# Patient Record
Sex: FEMALE | NOT HISPANIC OR LATINO | Employment: OTHER | URBAN - NONMETROPOLITAN AREA
[De-identification: names, ages, dates, MRNs, and addresses within clinical notes are randomized per-mention and may not be internally consistent; named-entity substitution may affect disease eponyms.]

---

## 2017-09-06 ENCOUNTER — LAB REQUISITION (OUTPATIENT)
Dept: LAB | Facility: HOSPITAL | Age: 77
End: 2017-09-06

## 2017-09-06 DIAGNOSIS — Z00.00 ENCOUNTER FOR GENERAL ADULT MEDICAL EXAMINATION WITHOUT ABNORMAL FINDINGS: ICD-10-CM

## 2017-09-06 PROCEDURE — 88307 TISSUE EXAM BY PATHOLOGIST: CPT | Performed by: SURGERY

## 2017-09-06 PROCEDURE — 88312 SPECIAL STAINS GROUP 1: CPT | Performed by: SURGERY

## 2017-09-13 LAB
CYTO UR: NORMAL
LAB AP CASE REPORT: NORMAL
LAB AP CLINICAL INFORMATION: NORMAL
Lab: NORMAL
PATH REPORT.FINAL DX SPEC: NORMAL
PATH REPORT.GROSS SPEC: NORMAL

## 2019-07-05 ENCOUNTER — HOSPITAL ENCOUNTER (OUTPATIENT)
Dept: PREADMISSION TESTING | Age: 79
Discharge: HOME | End: 2019-07-09
Payer: MEDICARE

## 2019-07-05 VITALS — WEIGHT: 250 LBS | HEIGHT: 69 IN | BODY MASS INDEX: 37.03 KG/M2

## 2019-07-05 LAB
ANION GAP SERPL CALCULATED.3IONS-SCNC: 14 MMOL/L (ref 7–19)
APTT: 33.8 SEC (ref 26–36.2)
BACTERIA: ABNORMAL /HPF
BASOPHILS ABSOLUTE: 0.1 K/UL (ref 0–0.2)
BASOPHILS RELATIVE PERCENT: 0.8 % (ref 0–1)
BILIRUBIN URINE: NEGATIVE
BLOOD, URINE: NEGATIVE
BUN BLDV-MCNC: 24 MG/DL (ref 8–23)
CALCIUM SERPL-MCNC: 9.3 MG/DL (ref 8.8–10.2)
CHLORIDE BLD-SCNC: 103 MMOL/L (ref 98–111)
CLARITY: ABNORMAL
CO2: 23 MMOL/L (ref 22–29)
COLOR: YELLOW
CREAT SERPL-MCNC: 0.7 MG/DL (ref 0.5–0.9)
EOSINOPHILS ABSOLUTE: 0.4 K/UL (ref 0–0.6)
EOSINOPHILS RELATIVE PERCENT: 4.9 % (ref 0–5)
EPITHELIAL CELLS, UA: 0 /HPF (ref 0–5)
GFR NON-AFRICAN AMERICAN: >60
GLUCOSE BLD-MCNC: 209 MG/DL (ref 74–109)
GLUCOSE URINE: NEGATIVE MG/DL
HCT VFR BLD CALC: 35.7 % (ref 37–47)
HEMOGLOBIN: 10.4 G/DL (ref 12–16)
HYALINE CASTS: 1 /HPF (ref 0–8)
INR BLD: 2.41 (ref 0.88–1.18)
KETONES, URINE: NEGATIVE MG/DL
LEUKOCYTE ESTERASE, URINE: ABNORMAL
LYMPHOCYTES ABSOLUTE: 1.6 K/UL (ref 1.1–4.5)
LYMPHOCYTES RELATIVE PERCENT: 22.1 % (ref 20–40)
MCH RBC QN AUTO: 22.8 PG (ref 27–31)
MCHC RBC AUTO-ENTMCNC: 29.1 G/DL (ref 33–37)
MCV RBC AUTO: 78.3 FL (ref 81–99)
MONOCYTES ABSOLUTE: 0.6 K/UL (ref 0–0.9)
MONOCYTES RELATIVE PERCENT: 7.7 % (ref 0–10)
NEUTROPHILS ABSOLUTE: 4.6 K/UL (ref 1.5–7.5)
NEUTROPHILS RELATIVE PERCENT: 64.1 % (ref 50–65)
NITRITE, URINE: NEGATIVE
PDW BLD-RTO: 17.3 % (ref 11.5–14.5)
PH UA: 6 (ref 5–8)
PLATELET # BLD: 159 K/UL (ref 130–400)
PMV BLD AUTO: 11.2 FL (ref 9.4–12.3)
POTASSIUM SERPL-SCNC: 4.1 MMOL/L (ref 3.5–5)
PROTEIN UA: NEGATIVE MG/DL
PROTHROMBIN TIME: 25.5 SEC (ref 12–14.6)
RBC # BLD: 4.56 M/UL (ref 4.2–5.4)
RBC UA: 1 /HPF (ref 0–4)
SODIUM BLD-SCNC: 140 MMOL/L (ref 136–145)
SPECIFIC GRAVITY UA: 1.02 (ref 1–1.03)
URINE REFLEX TO CULTURE: YES
UROBILINOGEN, URINE: 0.2 E.U./DL
WBC # BLD: 7.2 K/UL (ref 4.8–10.8)
WBC UA: 25 /HPF (ref 0–5)

## 2019-07-05 PROCEDURE — 87186 SC STD MICRODIL/AGAR DIL: CPT

## 2019-07-05 PROCEDURE — 85025 COMPLETE CBC W/AUTO DIFF WBC: CPT

## 2019-07-05 PROCEDURE — 87077 CULTURE AEROBIC IDENTIFY: CPT

## 2019-07-05 PROCEDURE — 85610 PROTHROMBIN TIME: CPT

## 2019-07-05 PROCEDURE — 93005 ELECTROCARDIOGRAM TRACING: CPT

## 2019-07-05 PROCEDURE — 85730 THROMBOPLASTIN TIME PARTIAL: CPT

## 2019-07-05 PROCEDURE — 81001 URINALYSIS AUTO W/SCOPE: CPT

## 2019-07-05 PROCEDURE — 87081 CULTURE SCREEN ONLY: CPT

## 2019-07-05 PROCEDURE — 36415 COLL VENOUS BLD VENIPUNCTURE: CPT

## 2019-07-05 PROCEDURE — 80048 BASIC METABOLIC PNL TOTAL CA: CPT

## 2019-07-05 PROCEDURE — 87086 URINE CULTURE/COLONY COUNT: CPT

## 2019-07-05 RX ORDER — NABUMETONE 500 MG/1
500 TABLET, FILM COATED ORAL 3 TIMES DAILY
COMMUNITY
End: 2020-06-23

## 2019-07-05 RX ORDER — RANITIDINE 150 MG/1
150 TABLET ORAL 2 TIMES DAILY
COMMUNITY
End: 2020-06-23

## 2019-07-05 RX ORDER — HYDROCHLOROTHIAZIDE 12.5 MG/1
12.5 CAPSULE, GELATIN COATED ORAL EVERY MORNING
COMMUNITY

## 2019-07-05 RX ORDER — GLIPIZIDE 10 MG/1
10 TABLET ORAL
COMMUNITY

## 2019-07-05 RX ORDER — SIMVASTATIN 40 MG
40 TABLET ORAL NIGHTLY
COMMUNITY

## 2019-07-05 RX ORDER — WARFARIN SODIUM 10 MG/1
5 TABLET ORAL DAILY
COMMUNITY
End: 2020-06-23

## 2019-07-05 RX ORDER — WARFARIN SODIUM 1 MG/1
2 TABLET ORAL DAILY
COMMUNITY
End: 2019-11-05

## 2019-07-05 RX ORDER — LEVOTHYROXINE SODIUM 0.2 MG/1
200 TABLET ORAL DAILY
COMMUNITY

## 2019-07-05 RX ORDER — LOSARTAN POTASSIUM 100 MG/1
100 TABLET ORAL DAILY
COMMUNITY

## 2019-07-05 RX ORDER — PAROXETINE HYDROCHLORIDE 20 MG/1
20 TABLET, FILM COATED ORAL EVERY MORNING
COMMUNITY

## 2019-07-05 SDOH — HEALTH STABILITY: MENTAL HEALTH: HOW OFTEN DO YOU HAVE A DRINK CONTAINING ALCOHOL?: NEVER

## 2019-07-06 LAB — MRSA CULTURE ONLY: NORMAL

## 2019-07-07 LAB
EKG P AXIS: 19 DEGREES
EKG P-R INTERVAL: 176 MS
EKG Q-T INTERVAL: 392 MS
EKG QRS DURATION: 106 MS
EKG QTC CALCULATION (BAZETT): 421 MS
EKG T AXIS: 20 DEGREES

## 2019-07-08 LAB
ORGANISM: ABNORMAL
ORGANISM: ABNORMAL
URINE CULTURE, ROUTINE: ABNORMAL

## 2019-07-22 RX ORDER — OXYCODONE HYDROCHLORIDE 5 MG/1
10 TABLET ORAL EVERY 4 HOURS PRN
Status: CANCELLED | OUTPATIENT
Start: 2019-07-22

## 2019-07-22 RX ORDER — OXYCODONE HYDROCHLORIDE 5 MG/1
20 TABLET ORAL EVERY 4 HOURS PRN
Status: CANCELLED | OUTPATIENT
Start: 2019-07-22

## 2019-07-25 ENCOUNTER — ANESTHESIA (OUTPATIENT)
Dept: OPERATING ROOM | Age: 79
DRG: 470 | End: 2019-07-25
Payer: MEDICARE

## 2019-07-25 ENCOUNTER — HOSPITAL ENCOUNTER (INPATIENT)
Age: 79
LOS: 3 days | Discharge: NURSING, REHAB OR LONG-TERM CARE | DRG: 470 | End: 2019-07-28
Attending: ORTHOPAEDIC SURGERY | Admitting: ORTHOPAEDIC SURGERY
Payer: MEDICARE

## 2019-07-25 ENCOUNTER — APPOINTMENT (OUTPATIENT)
Dept: GENERAL RADIOLOGY | Age: 79
DRG: 470 | End: 2019-07-25
Attending: ORTHOPAEDIC SURGERY
Payer: MEDICARE

## 2019-07-25 ENCOUNTER — ANESTHESIA EVENT (OUTPATIENT)
Dept: OPERATING ROOM | Age: 79
DRG: 470 | End: 2019-07-25
Payer: MEDICARE

## 2019-07-25 VITALS
SYSTOLIC BLOOD PRESSURE: 119 MMHG | TEMPERATURE: 98.1 F | RESPIRATION RATE: 1 BRPM | OXYGEN SATURATION: 99 % | DIASTOLIC BLOOD PRESSURE: 57 MMHG

## 2019-07-25 DIAGNOSIS — M16.11 PRIMARY OSTEOARTHRITIS OF RIGHT HIP: Primary | ICD-10-CM

## 2019-07-25 LAB
ABO/RH: NORMAL
ANTIBODY SCREEN: NORMAL
GLUCOSE BLD-MCNC: 207 MG/DL (ref 70–99)
GLUCOSE BLD-MCNC: 294 MG/DL (ref 70–99)
GLUCOSE BLD-MCNC: 304 MG/DL (ref 70–99)
GLUCOSE BLD-MCNC: 394 MG/DL (ref 70–99)
INR BLD: 1.16 (ref 0.88–1.18)
PERFORMED ON: ABNORMAL
PROTHROMBIN TIME: 14.2 SEC (ref 12–14.6)

## 2019-07-25 PROCEDURE — 3600000015 HC SURGERY LEVEL 5 ADDTL 15MIN: Performed by: ORTHOPAEDIC SURGERY

## 2019-07-25 PROCEDURE — 2580000003 HC RX 258: Performed by: ORTHOPAEDIC SURGERY

## 2019-07-25 PROCEDURE — 36415 COLL VENOUS BLD VENIPUNCTURE: CPT

## 2019-07-25 PROCEDURE — 6370000000 HC RX 637 (ALT 250 FOR IP): Performed by: ORTHOPAEDIC SURGERY

## 2019-07-25 PROCEDURE — 3600000005 HC SURGERY LEVEL 5 BASE: Performed by: ORTHOPAEDIC SURGERY

## 2019-07-25 PROCEDURE — 1210000000 HC MED SURG R&B

## 2019-07-25 PROCEDURE — 2709999900 HC NON-CHARGEABLE SUPPLY: Performed by: ORTHOPAEDIC SURGERY

## 2019-07-25 PROCEDURE — 6360000002 HC RX W HCPCS: Performed by: ORTHOPAEDIC SURGERY

## 2019-07-25 PROCEDURE — 82948 REAGENT STRIP/BLOOD GLUCOSE: CPT

## 2019-07-25 PROCEDURE — 6360000002 HC RX W HCPCS

## 2019-07-25 PROCEDURE — 2500000003 HC RX 250 WO HCPCS

## 2019-07-25 PROCEDURE — 2700000000 HC OXYGEN THERAPY PER DAY

## 2019-07-25 PROCEDURE — C1713 ANCHOR/SCREW BN/BN,TIS/BN: HCPCS | Performed by: ORTHOPAEDIC SURGERY

## 2019-07-25 PROCEDURE — 3700000001 HC ADD 15 MINUTES (ANESTHESIA): Performed by: ORTHOPAEDIC SURGERY

## 2019-07-25 PROCEDURE — 86901 BLOOD TYPING SEROLOGIC RH(D): CPT

## 2019-07-25 PROCEDURE — C1776 JOINT DEVICE (IMPLANTABLE): HCPCS | Performed by: ORTHOPAEDIC SURGERY

## 2019-07-25 PROCEDURE — 85610 PROTHROMBIN TIME: CPT

## 2019-07-25 PROCEDURE — 86850 RBC ANTIBODY SCREEN: CPT

## 2019-07-25 PROCEDURE — 86900 BLOOD TYPING SEROLOGIC ABO: CPT

## 2019-07-25 PROCEDURE — 3700000000 HC ANESTHESIA ATTENDED CARE: Performed by: ORTHOPAEDIC SURGERY

## 2019-07-25 PROCEDURE — 3209999900 FLUORO FOR SURGICAL PROCEDURES

## 2019-07-25 PROCEDURE — 0SR903A REPLACEMENT OF RIGHT HIP JOINT WITH CERAMIC SYNTHETIC SUBSTITUTE, UNCEMENTED, OPEN APPROACH: ICD-10-PCS | Performed by: ORTHOPAEDIC SURGERY

## 2019-07-25 PROCEDURE — 2500000003 HC RX 250 WO HCPCS: Performed by: ORTHOPAEDIC SURGERY

## 2019-07-25 PROCEDURE — 73502 X-RAY EXAM HIP UNI 2-3 VIEWS: CPT

## 2019-07-25 PROCEDURE — 7100000001 HC PACU RECOVERY - ADDTL 15 MIN: Performed by: ORTHOPAEDIC SURGERY

## 2019-07-25 PROCEDURE — 7100000000 HC PACU RECOVERY - FIRST 15 MIN: Performed by: ORTHOPAEDIC SURGERY

## 2019-07-25 DEVICE — ORIGIN STEM, STANDARD OFFSET SIZE 12 COLLARED
Type: IMPLANTABLE DEVICE | Site: HIP | Status: FUNCTIONAL
Brand: PAXEON ORIGIN

## 2019-07-25 DEVICE — IMPLANTABLE DEVICE
Type: IMPLANTABLE DEVICE | Site: HIP | Status: FUNCTIONAL
Brand: LOGICAL

## 2019-07-25 DEVICE — IMPLANTABLE DEVICE
Type: IMPLANTABLE DEVICE | Site: HIP | Status: FUNCTIONAL
Brand: SIGNATURE FEMORAL HEAD

## 2019-07-25 DEVICE — IMPLANTABLE DEVICE
Type: IMPLANTABLE DEVICE | Site: HIP | Status: FUNCTIONAL
Brand: LOGICAL G-SERIES

## 2019-07-25 RX ORDER — TRANEXAMIC ACID 650 1/1
1950 TABLET ORAL
Status: COMPLETED | OUTPATIENT
Start: 2019-07-25 | End: 2019-07-25

## 2019-07-25 RX ORDER — FAMOTIDINE 20 MG/1
20 TABLET, FILM COATED ORAL 2 TIMES DAILY PRN
Status: DISCONTINUED | OUTPATIENT
Start: 2019-07-25 | End: 2019-07-28 | Stop reason: HOSPADM

## 2019-07-25 RX ORDER — DEXAMETHASONE SODIUM PHOSPHATE 10 MG/ML
INJECTION INTRAMUSCULAR; INTRAVENOUS PRN
Status: DISCONTINUED | OUTPATIENT
Start: 2019-07-25 | End: 2019-07-25 | Stop reason: SDUPTHER

## 2019-07-25 RX ORDER — DEXAMETHASONE SODIUM PHOSPHATE 10 MG/ML
8 INJECTION INTRAMUSCULAR; INTRAVENOUS ONCE
Status: DISCONTINUED | OUTPATIENT
Start: 2019-07-25 | End: 2019-07-25 | Stop reason: HOSPADM

## 2019-07-25 RX ORDER — WARFARIN SODIUM 5 MG/1
10 TABLET ORAL DAILY
Status: DISCONTINUED | OUTPATIENT
Start: 2019-07-25 | End: 2019-07-25 | Stop reason: DRUGHIGH

## 2019-07-25 RX ORDER — FAMOTIDINE 20 MG/1
20 TABLET, FILM COATED ORAL NIGHTLY
Status: DISCONTINUED | OUTPATIENT
Start: 2019-07-25 | End: 2019-07-28 | Stop reason: HOSPADM

## 2019-07-25 RX ORDER — SODIUM CHLORIDE 9 MG/ML
INJECTION, SOLUTION INTRAVENOUS CONTINUOUS
Status: DISCONTINUED | OUTPATIENT
Start: 2019-07-25 | End: 2019-07-25

## 2019-07-25 RX ORDER — PROMETHAZINE HYDROCHLORIDE 25 MG/ML
6.25 INJECTION, SOLUTION INTRAMUSCULAR; INTRAVENOUS
Status: DISCONTINUED | OUTPATIENT
Start: 2019-07-25 | End: 2019-07-25 | Stop reason: HOSPADM

## 2019-07-25 RX ORDER — ROPIVACAINE HYDROCHLORIDE 2 MG/ML
INJECTION, SOLUTION EPIDURAL; INFILTRATION; PERINEURAL PRN
Status: DISCONTINUED | OUTPATIENT
Start: 2019-07-25 | End: 2019-07-25 | Stop reason: HOSPADM

## 2019-07-25 RX ORDER — SODIUM CHLORIDE, SODIUM LACTATE, POTASSIUM CHLORIDE, CALCIUM CHLORIDE 600; 310; 30; 20 MG/100ML; MG/100ML; MG/100ML; MG/100ML
INJECTION, SOLUTION INTRAVENOUS CONTINUOUS
Status: DISCONTINUED | OUTPATIENT
Start: 2019-07-25 | End: 2019-07-25

## 2019-07-25 RX ORDER — LABETALOL 20 MG/4 ML (5 MG/ML) INTRAVENOUS SYRINGE
5 EVERY 10 MIN PRN
Status: DISCONTINUED | OUTPATIENT
Start: 2019-07-25 | End: 2019-07-25 | Stop reason: HOSPADM

## 2019-07-25 RX ORDER — SODIUM CHLORIDE 0.9 % (FLUSH) 0.9 %
10 SYRINGE (ML) INJECTION EVERY 12 HOURS SCHEDULED
Status: DISCONTINUED | OUTPATIENT
Start: 2019-07-25 | End: 2019-07-25 | Stop reason: HOSPADM

## 2019-07-25 RX ORDER — DOCUSATE SODIUM 100 MG/1
100 CAPSULE, LIQUID FILLED ORAL 2 TIMES DAILY
Status: DISCONTINUED | OUTPATIENT
Start: 2019-07-25 | End: 2019-07-28 | Stop reason: HOSPADM

## 2019-07-25 RX ORDER — FENTANYL CITRATE 50 UG/ML
50 INJECTION, SOLUTION INTRAMUSCULAR; INTRAVENOUS
Status: DISCONTINUED | OUTPATIENT
Start: 2019-07-25 | End: 2019-07-25 | Stop reason: HOSPADM

## 2019-07-25 RX ORDER — MORPHINE SULFATE 2 MG/ML
2 INJECTION, SOLUTION INTRAMUSCULAR; INTRAVENOUS EVERY 5 MIN PRN
Status: DISCONTINUED | OUTPATIENT
Start: 2019-07-25 | End: 2019-07-25 | Stop reason: HOSPADM

## 2019-07-25 RX ORDER — SODIUM CHLORIDE 0.9 % (FLUSH) 0.9 %
10 SYRINGE (ML) INJECTION PRN
Status: DISCONTINUED | OUTPATIENT
Start: 2019-07-25 | End: 2019-07-25 | Stop reason: HOSPADM

## 2019-07-25 RX ORDER — GABAPENTIN 300 MG/1
300 CAPSULE ORAL ONCE
Status: COMPLETED | OUTPATIENT
Start: 2019-07-25 | End: 2019-07-25

## 2019-07-25 RX ORDER — SIMVASTATIN 40 MG
40 TABLET ORAL NIGHTLY
Status: DISCONTINUED | OUTPATIENT
Start: 2019-07-25 | End: 2019-07-28 | Stop reason: HOSPADM

## 2019-07-25 RX ORDER — MORPHINE SULFATE 2 MG/ML
4 INJECTION, SOLUTION INTRAMUSCULAR; INTRAVENOUS EVERY 5 MIN PRN
Status: DISCONTINUED | OUTPATIENT
Start: 2019-07-25 | End: 2019-07-25 | Stop reason: HOSPADM

## 2019-07-25 RX ORDER — ONDANSETRON 2 MG/ML
INJECTION INTRAMUSCULAR; INTRAVENOUS PRN
Status: DISCONTINUED | OUTPATIENT
Start: 2019-07-25 | End: 2019-07-25 | Stop reason: SDUPTHER

## 2019-07-25 RX ORDER — METOCLOPRAMIDE HYDROCHLORIDE 5 MG/ML
10 INJECTION INTRAMUSCULAR; INTRAVENOUS
Status: DISCONTINUED | OUTPATIENT
Start: 2019-07-25 | End: 2019-07-25 | Stop reason: HOSPADM

## 2019-07-25 RX ORDER — SODIUM CHLORIDE 0.9 % (FLUSH) 0.9 %
10 SYRINGE (ML) INJECTION EVERY 12 HOURS SCHEDULED
Status: DISCONTINUED | OUTPATIENT
Start: 2019-07-25 | End: 2019-07-28 | Stop reason: HOSPADM

## 2019-07-25 RX ORDER — WARFARIN SODIUM 5 MG/1
5 TABLET ORAL DAILY
Status: DISCONTINUED | OUTPATIENT
Start: 2019-07-25 | End: 2019-07-25

## 2019-07-25 RX ORDER — FENTANYL CITRATE 50 UG/ML
INJECTION, SOLUTION INTRAMUSCULAR; INTRAVENOUS PRN
Status: DISCONTINUED | OUTPATIENT
Start: 2019-07-25 | End: 2019-07-25 | Stop reason: SDUPTHER

## 2019-07-25 RX ORDER — 0.9 % SODIUM CHLORIDE 0.9 %
500 INTRAVENOUS SOLUTION INTRAVENOUS PRN
Status: DISCONTINUED | OUTPATIENT
Start: 2019-07-25 | End: 2019-07-28 | Stop reason: HOSPADM

## 2019-07-25 RX ORDER — CELECOXIB 100 MG/1
100 CAPSULE ORAL ONCE
Status: COMPLETED | OUTPATIENT
Start: 2019-07-25 | End: 2019-07-25

## 2019-07-25 RX ORDER — ACETAMINOPHEN 325 MG/1
650 TABLET ORAL EVERY 6 HOURS
Status: DISCONTINUED | OUTPATIENT
Start: 2019-07-25 | End: 2019-07-28 | Stop reason: HOSPADM

## 2019-07-25 RX ORDER — MORPHINE SULFATE 2 MG/ML
2 INJECTION, SOLUTION INTRAMUSCULAR; INTRAVENOUS
Status: DISCONTINUED | OUTPATIENT
Start: 2019-07-25 | End: 2019-07-28 | Stop reason: HOSPADM

## 2019-07-25 RX ORDER — FENTANYL CITRATE 50 UG/ML
25 INJECTION, SOLUTION INTRAMUSCULAR; INTRAVENOUS
Status: DISCONTINUED | OUTPATIENT
Start: 2019-07-25 | End: 2019-07-25 | Stop reason: HOSPADM

## 2019-07-25 RX ORDER — HYDROCHLOROTHIAZIDE 12.5 MG/1
12.5 CAPSULE, GELATIN COATED ORAL EVERY MORNING
Status: DISCONTINUED | OUTPATIENT
Start: 2019-07-26 | End: 2019-07-28 | Stop reason: HOSPADM

## 2019-07-25 RX ORDER — ROCURONIUM BROMIDE 10 MG/ML
INJECTION, SOLUTION INTRAVENOUS PRN
Status: DISCONTINUED | OUTPATIENT
Start: 2019-07-25 | End: 2019-07-25 | Stop reason: SDUPTHER

## 2019-07-25 RX ORDER — LOSARTAN POTASSIUM 100 MG/1
100 TABLET ORAL DAILY
Status: DISCONTINUED | OUTPATIENT
Start: 2019-07-25 | End: 2019-07-28 | Stop reason: HOSPADM

## 2019-07-25 RX ORDER — OXYCODONE HYDROCHLORIDE 5 MG/1
10 TABLET ORAL EVERY 4 HOURS PRN
Status: DISCONTINUED | OUTPATIENT
Start: 2019-07-25 | End: 2019-07-28 | Stop reason: HOSPADM

## 2019-07-25 RX ORDER — OXYCODONE HYDROCHLORIDE 5 MG/1
5 TABLET ORAL EVERY 4 HOURS PRN
Status: DISCONTINUED | OUTPATIENT
Start: 2019-07-25 | End: 2019-07-28 | Stop reason: HOSPADM

## 2019-07-25 RX ORDER — DEXTROSE MONOHYDRATE 50 MG/ML
100 INJECTION, SOLUTION INTRAVENOUS PRN
Status: DISCONTINUED | OUTPATIENT
Start: 2019-07-25 | End: 2019-07-28 | Stop reason: HOSPADM

## 2019-07-25 RX ORDER — DIPHENHYDRAMINE HYDROCHLORIDE 50 MG/ML
12.5 INJECTION INTRAMUSCULAR; INTRAVENOUS
Status: DISCONTINUED | OUTPATIENT
Start: 2019-07-25 | End: 2019-07-25 | Stop reason: HOSPADM

## 2019-07-25 RX ORDER — SODIUM CHLORIDE 0.9 % (FLUSH) 0.9 %
10 SYRINGE (ML) INJECTION PRN
Status: DISCONTINUED | OUTPATIENT
Start: 2019-07-25 | End: 2019-07-28 | Stop reason: HOSPADM

## 2019-07-25 RX ORDER — MIDAZOLAM HYDROCHLORIDE 1 MG/ML
2 INJECTION INTRAMUSCULAR; INTRAVENOUS
Status: DISCONTINUED | OUTPATIENT
Start: 2019-07-25 | End: 2019-07-25 | Stop reason: HOSPADM

## 2019-07-25 RX ORDER — CELECOXIB 200 MG/1
200 CAPSULE ORAL 2 TIMES DAILY
Status: DISCONTINUED | OUTPATIENT
Start: 2019-07-25 | End: 2019-07-28 | Stop reason: HOSPADM

## 2019-07-25 RX ORDER — HYDRALAZINE HYDROCHLORIDE 20 MG/ML
5 INJECTION INTRAMUSCULAR; INTRAVENOUS EVERY 10 MIN PRN
Status: DISCONTINUED | OUTPATIENT
Start: 2019-07-25 | End: 2019-07-25 | Stop reason: HOSPADM

## 2019-07-25 RX ORDER — SUCCINYLCHOLINE CHLORIDE 20 MG/ML
INJECTION INTRAMUSCULAR; INTRAVENOUS PRN
Status: DISCONTINUED | OUTPATIENT
Start: 2019-07-25 | End: 2019-07-25 | Stop reason: SDUPTHER

## 2019-07-25 RX ORDER — PROPOFOL 10 MG/ML
INJECTION, EMULSION INTRAVENOUS PRN
Status: DISCONTINUED | OUTPATIENT
Start: 2019-07-25 | End: 2019-07-25 | Stop reason: SDUPTHER

## 2019-07-25 RX ORDER — WARFARIN SODIUM 2 MG/1
2 TABLET ORAL DAILY
Status: DISCONTINUED | OUTPATIENT
Start: 2019-07-25 | End: 2019-07-25 | Stop reason: DRUGHIGH

## 2019-07-25 RX ORDER — PAROXETINE HYDROCHLORIDE 20 MG/1
20 TABLET, FILM COATED ORAL EVERY MORNING
Status: DISCONTINUED | OUTPATIENT
Start: 2019-07-26 | End: 2019-07-28 | Stop reason: HOSPADM

## 2019-07-25 RX ORDER — ENALAPRILAT 2.5 MG/2ML
1.25 INJECTION INTRAVENOUS
Status: DISCONTINUED | OUTPATIENT
Start: 2019-07-25 | End: 2019-07-25 | Stop reason: HOSPADM

## 2019-07-25 RX ORDER — NICOTINE POLACRILEX 4 MG
15 LOZENGE BUCCAL PRN
Status: DISCONTINUED | OUTPATIENT
Start: 2019-07-25 | End: 2019-07-28 | Stop reason: HOSPADM

## 2019-07-25 RX ORDER — MEPERIDINE HYDROCHLORIDE 50 MG/ML
12.5 INJECTION INTRAMUSCULAR; INTRAVENOUS; SUBCUTANEOUS EVERY 5 MIN PRN
Status: DISCONTINUED | OUTPATIENT
Start: 2019-07-25 | End: 2019-07-25 | Stop reason: HOSPADM

## 2019-07-25 RX ORDER — EPHEDRINE SULFATE 50 MG/ML
INJECTION, SOLUTION INTRAVENOUS PRN
Status: DISCONTINUED | OUTPATIENT
Start: 2019-07-25 | End: 2019-07-25 | Stop reason: SDUPTHER

## 2019-07-25 RX ORDER — LIDOCAINE HYDROCHLORIDE 10 MG/ML
1 INJECTION, SOLUTION EPIDURAL; INFILTRATION; INTRACAUDAL; PERINEURAL
Status: DISCONTINUED | OUTPATIENT
Start: 2019-07-25 | End: 2019-07-25 | Stop reason: HOSPADM

## 2019-07-25 RX ORDER — OXYCODONE HCL 10 MG/1
10 TABLET, FILM COATED, EXTENDED RELEASE ORAL
Status: COMPLETED | OUTPATIENT
Start: 2019-07-25 | End: 2019-07-25

## 2019-07-25 RX ORDER — LIDOCAINE HYDROCHLORIDE 10 MG/ML
INJECTION, SOLUTION EPIDURAL; INFILTRATION; INTRACAUDAL; PERINEURAL PRN
Status: DISCONTINUED | OUTPATIENT
Start: 2019-07-25 | End: 2019-07-25 | Stop reason: SDUPTHER

## 2019-07-25 RX ORDER — ONDANSETRON 2 MG/ML
4 INJECTION INTRAMUSCULAR; INTRAVENOUS EVERY 6 HOURS PRN
Status: DISCONTINUED | OUTPATIENT
Start: 2019-07-25 | End: 2019-07-28 | Stop reason: HOSPADM

## 2019-07-25 RX ORDER — MORPHINE SULFATE 2 MG/ML
4 INJECTION, SOLUTION INTRAMUSCULAR; INTRAVENOUS
Status: DISCONTINUED | OUTPATIENT
Start: 2019-07-25 | End: 2019-07-28 | Stop reason: HOSPADM

## 2019-07-25 RX ORDER — ACETAMINOPHEN 500 MG
1000 TABLET ORAL ONCE
Status: COMPLETED | OUTPATIENT
Start: 2019-07-25 | End: 2019-07-25

## 2019-07-25 RX ORDER — GLIPIZIDE 5 MG/1
10 TABLET ORAL
Status: DISCONTINUED | OUTPATIENT
Start: 2019-07-25 | End: 2019-07-28 | Stop reason: HOSPADM

## 2019-07-25 RX ORDER — LEVOTHYROXINE SODIUM 0.1 MG/1
200 TABLET ORAL DAILY
Status: DISCONTINUED | OUTPATIENT
Start: 2019-07-25 | End: 2019-07-28 | Stop reason: HOSPADM

## 2019-07-25 RX ORDER — SODIUM CHLORIDE, SODIUM LACTATE, POTASSIUM CHLORIDE, CALCIUM CHLORIDE 600; 310; 30; 20 MG/100ML; MG/100ML; MG/100ML; MG/100ML
INJECTION, SOLUTION INTRAVENOUS CONTINUOUS
Status: DISCONTINUED | OUTPATIENT
Start: 2019-07-25 | End: 2019-07-28 | Stop reason: HOSPADM

## 2019-07-25 RX ORDER — DEXTROSE MONOHYDRATE 25 G/50ML
12.5 INJECTION, SOLUTION INTRAVENOUS PRN
Status: DISCONTINUED | OUTPATIENT
Start: 2019-07-25 | End: 2019-07-28 | Stop reason: HOSPADM

## 2019-07-25 RX ADMIN — SODIUM CHLORIDE, SODIUM LACTATE, POTASSIUM CHLORIDE, AND CALCIUM CHLORIDE: 600; 310; 30; 20 INJECTION, SOLUTION INTRAVENOUS at 13:23

## 2019-07-25 RX ADMIN — ACETAMINOPHEN 1000 MG: 500 TABLET, FILM COATED ORAL at 09:33

## 2019-07-25 RX ADMIN — DEXAMETHASONE SODIUM PHOSPHATE 10 MG: 10 INJECTION INTRAMUSCULAR; INTRAVENOUS at 12:15

## 2019-07-25 RX ADMIN — Medication 2 G: at 20:28

## 2019-07-25 RX ADMIN — TRANEXAMIC ACID 1950 MG: 650 TABLET ORAL at 09:33

## 2019-07-25 RX ADMIN — DOCUSATE SODIUM 100 MG: 100 CAPSULE, LIQUID FILLED ORAL at 20:31

## 2019-07-25 RX ADMIN — OXYCODONE HYDROCHLORIDE 5 MG: 5 TABLET ORAL at 16:33

## 2019-07-25 RX ADMIN — GABAPENTIN 300 MG: 300 CAPSULE ORAL at 09:33

## 2019-07-25 RX ADMIN — SUCCINYLCHOLINE CHLORIDE 140 MG: 20 INJECTION, SOLUTION INTRAMUSCULAR; INTRAVENOUS; PARENTERAL at 12:06

## 2019-07-25 RX ADMIN — Medication 10 ML: at 20:32

## 2019-07-25 RX ADMIN — ACETAMINOPHEN 650 MG: 325 TABLET ORAL at 16:36

## 2019-07-25 RX ADMIN — SODIUM CHLORIDE, POTASSIUM CHLORIDE, SODIUM LACTATE AND CALCIUM CHLORIDE: 600; 310; 30; 20 INJECTION, SOLUTION INTRAVENOUS at 15:34

## 2019-07-25 RX ADMIN — LIDOCAINE HYDROCHLORIDE 50 MG: 10 INJECTION, SOLUTION EPIDURAL; INFILTRATION; INTRACAUDAL; PERINEURAL at 12:06

## 2019-07-25 RX ADMIN — PROPOFOL 160 MG: 10 INJECTION, EMULSION INTRAVENOUS at 12:06

## 2019-07-25 RX ADMIN — SODIUM CHLORIDE, SODIUM LACTATE, POTASSIUM CHLORIDE, AND CALCIUM CHLORIDE: 600; 310; 30; 20 INJECTION, SOLUTION INTRAVENOUS at 09:34

## 2019-07-25 RX ADMIN — WARFARIN SODIUM 12 MG: 5 TABLET ORAL at 18:05

## 2019-07-25 RX ADMIN — FENTANYL CITRATE 50 MCG: 50 INJECTION INTRAMUSCULAR; INTRAVENOUS at 12:31

## 2019-07-25 RX ADMIN — CELECOXIB 200 MG: 200 CAPSULE ORAL at 20:31

## 2019-07-25 RX ADMIN — SIMVASTATIN 40 MG: 40 TABLET, FILM COATED ORAL at 20:31

## 2019-07-25 RX ADMIN — ACETAMINOPHEN 650 MG: 325 TABLET ORAL at 22:30

## 2019-07-25 RX ADMIN — Medication 10 ML: at 20:31

## 2019-07-25 RX ADMIN — METFORMIN HYDROCHLORIDE 1000 MG: 500 TABLET ORAL at 16:33

## 2019-07-25 RX ADMIN — ONDANSETRON HYDROCHLORIDE 4 MG: 2 INJECTION, SOLUTION INTRAMUSCULAR; INTRAVENOUS at 13:42

## 2019-07-25 RX ADMIN — GLIPIZIDE 10 MG: 5 TABLET ORAL at 16:33

## 2019-07-25 RX ADMIN — CELECOXIB 100 MG: 100 CAPSULE ORAL at 09:33

## 2019-07-25 RX ADMIN — FENTANYL CITRATE 50 MCG: 50 INJECTION INTRAMUSCULAR; INTRAVENOUS at 12:01

## 2019-07-25 RX ADMIN — EPHEDRINE SULFATE 10 MG: 50 INJECTION, SOLUTION INTRAMUSCULAR; INTRAVENOUS; SUBCUTANEOUS at 12:22

## 2019-07-25 RX ADMIN — FAMOTIDINE 20 MG: 20 TABLET ORAL at 20:31

## 2019-07-25 RX ADMIN — ROCURONIUM BROMIDE 50 MG: 10 INJECTION INTRAVENOUS at 12:20

## 2019-07-25 RX ADMIN — OXYCODONE HYDROCHLORIDE 10 MG: 10 TABLET, FILM COATED, EXTENDED RELEASE ORAL at 09:33

## 2019-07-25 RX ADMIN — Medication 2 G: at 12:17

## 2019-07-25 RX ADMIN — FAMOTIDINE 20 MG: 20 TABLET ORAL at 20:34

## 2019-07-25 RX ADMIN — SUGAMMADEX 300 MG: 100 INJECTION, SOLUTION INTRAVENOUS at 13:42

## 2019-07-25 RX ADMIN — MORPHINE SULFATE 4 MG: 2 INJECTION, SOLUTION INTRAMUSCULAR; INTRAVENOUS at 14:37

## 2019-07-25 ASSESSMENT — PAIN SCALES - GENERAL
PAINLEVEL_OUTOF10: 0
PAINLEVEL_OUTOF10: 0
PAINLEVEL_OUTOF10: 7
PAINLEVEL_OUTOF10: 5
PAINLEVEL_OUTOF10: 7
PAINLEVEL_OUTOF10: 3

## 2019-07-25 NOTE — OP NOTE
MEDIAL AND ANTERIOR THAN USUAL SO HAD TO USE 3 SCREWS SUPERIORLY TO ASSURE FIXATION OF THE CUP. A maximized view of the pelvis was obtained with the c-arm and the acetabular component was impacted in place at 45 degrees of abduction and 20 degrees of anteversion. The liner was snapped in place. The lift hook for the [place] bed was placed around the proximal femur and the leg was externally rotated 120 degrees and the hip was extended and adducted. Bent ellis retractors were placed medial to the femoral neck and proximal to the greater trochanter. The lift hook was raised and the lateral capsule was released off the medial surface of the greater trochanter and lateral neck. A cookie cutter was used to remove metaphyseal bone. A rat tail rasp was used to work the lateral bone. The femoral canal was broached from a size 4 up to a size where the broach was axially and rotationally stable. The anteversion of the broach was 10 degrees. A calcar planer was used to remove minimal neck bone. The trial neck and head were placed and the hip was reduced. C-arm showed that the trial filled the canal well and that the leg lengths were even. Offset was similar to the opposite hip. The hip was stable when the leg was externally rotated 90 degrees, the hip extended 20 degrees and an anterior pull applied to the neck. The hip was stable in the sleeping position and with 90 degrees of flexion and internal rotation of 50 degrees. The hip was dislocated and the trial femoral component removed. The actual stem was impacted to the same position and orientation as the trial.  The trunion was cleaned and the chosen head was impacted in place. The hip was reduced. C-arm showed good position of the implants and no fractures. The wound was checked for bleeding and pulse lavaged with antibiotic irrigation. The capsule was closed with 0 vicryl running suture. The tissues were injected with 40 cc of bupivacaine. The fascia was closed with running 0 vicryl, the subcutaneous layer with 2-0 vicryl and the skin closed with running 3-0 vicryl and prineo. A sterile dressing was placed. The patient was awakened, extubated and transferred to recovery in stable condition. ITERATIONS   Neck Length (mm) Offset: Other Stable Ant?  Leg Length difference  (mm) Stable Post?                                                           Electronically signed by Marlon Wells MD on 7/25/2019 at 2:01 PM

## 2019-07-25 NOTE — ANESTHESIA POSTPROCEDURE EVALUATION
Department of Anesthesiology  Postprocedure Note    Patient: Bernardine Duverney Adams-Bridwell  MRN: [id number]  YOB: 1940  Date of evaluation: 7/25/2019  Time:  2:13 PM     Procedure Summary     Date:  07/25/19 Room / Location:  [facility] OR 09 / [facility] OR    Anesthesia Start:  1200 Anesthesia Stop:  3902    Procedure:  RIGHT TOTAL HIP REPLACEMENT (Right Hip) Diagnosis:  (M16.11)    Surgeon:  Machelle Moss MD Responsible Provider:  BRADEN Moreno CRNA    Anesthesia Type:  general ASA Status:  3          Anesthesia Type: general    Duy Phase I:      Duy Phase II:      Last vitals: Reviewed and per EMR flowsheets.        Anesthesia Post Evaluation

## 2019-07-25 NOTE — H&P
[facility] ([facility]) Pre-Operative History and Physical    Patient Name: Neeta  : 1940        Chief Complaint: right hip pain  History of Present Illness: This patient has had ongoing pain for several weeks/months that has been unresponsive to conservative care which has included injection, therapy, activity modification and presents now for surgery. Pain is deep in the groin buttock extending to the knee at times. Pain is a severe ache that is worse with walking, standing and getting up and down. Pain is somewhat improved with over the counter medication. Worse since a fall in October. Diagnosed with right groin dvt in February and is on coumadin which was held preop. Past Medical History:       Diagnosis Date    Acid reflux     Arthritis     Diabetes mellitus ([address]     Hx of blood clots 2018    right Groin     Hyperlipidemia     Hypertension     Thyroid disease      Past Surgical History:       Procedure Laterality Date    CHOLECYSTECTOMY      EYE SURGERY Bilateral     CATARACTS REMOVED WITH LENS IMPLANTS    LITHOTRIPSY      CYSTOSCOPY WITH LITHO    TOTAL KNEE ARTHROPLASTY Bilateral     [place]    TOTAL THYROIDECTOMY      TUBAL LIGATION         Medications:   Prior to Admission medications    Medication Sig Start Date End Date Taking?  Authorizing Provider   enoxaparin (LOVENOX) 40 MG/0.4ML injection Inject 1 mg/kg into the skin once   Yes Historical Provider, MD   simvastatin (ZOCOR) 40 MG tablet Take 40 mg by mouth nightly   Yes Historical Provider, MD   glipiZIDE (GLUCOTROL) 10 MG tablet Take 10 mg by mouth 2 times daily (before meals)   Yes Historical Provider, MD   losartan (COZAAR) 100 MG tablet Take 100 mg by mouth daily   Yes Historical Provider, MD   levothyroxine (SYNTHROID) 200 MCG tablet Take 200 mcg by mouth Daily   Yes Historical Provider, MD   nabumetone (RELAFEN) 500 MG tablet Take 500 mg by mouth 3 times daily   Yes Historical Provider, MD

## 2019-07-25 NOTE — ANESTHESIA PRE PROCEDURE
Department of Anesthesiology  Preprocedure Note       Name:  Elfego Weber   Age:  78 y.o.  :  1940                                          MRN:  [id number]         Date:  2019      Surgeon: Spero Spatz):  Latanya Holguin MD    Procedure: RIGHT TOTAL HIP REPLACEMENT (Right Hip)    Medications prior to admission:   Prior to Admission medications    Medication Sig Start Date End Date Taking?  Authorizing Provider   enoxaparin (LOVENOX) 40 MG/0.4ML injection Inject 1 mg/kg into the skin once   Yes Historical Provider, MD   simvastatin (ZOCOR) 40 MG tablet Take 40 mg by mouth nightly   Yes Historical Provider, MD   glipiZIDE (GLUCOTROL) 10 MG tablet Take 10 mg by mouth 2 times daily (before meals)   Yes Historical Provider, MD   losartan (COZAAR) 100 MG tablet Take 100 mg by mouth daily   Yes Historical Provider, MD   levothyroxine (SYNTHROID) 200 MCG tablet Take 200 mcg by mouth Daily   Yes Historical Provider, MD   nabumetone (RELAFEN) 500 MG tablet Take 500 mg by mouth 3 times daily   Yes Historical Provider, MD   PARoxetine (PAXIL) 20 MG tablet Take 20 mg by mouth every morning   Yes Historical Provider, MD   metFORMIN (GLUCOPHAGE) 1000 MG tablet Take 1,000 mg by mouth 2 times daily (with meals)   Yes Historical Provider, MD   ranitidine (RANITIDINE 150 MAX STRENGTH) 150 MG tablet Take 150 mg by mouth 2 times daily   Yes Historical Provider, MD   hydrochlorothiazide (MICROZIDE) 12.5 MG capsule Take 12.5 mg by mouth every morning   Yes Historical Provider, MD   warfarin (COUMADIN) 10 MG tablet Take 10 mg by mouth daily TAKES A TOTAL OF 12 MG PER DAY    Historical Provider, MD   warfarin (COUMADIN) 1 MG tablet Take 2 mg by mouth daily TAKES A TOTAL OF 12 MG PER DAY    Historical Provider, MD       Current medications:    Current Facility-Administered Medications   Medication Dose Route Frequency Provider Last Rate Last Dose    lactated ringers infusion   Intravenous Continuous Raymundo Hewitt

## 2019-07-25 NOTE — LETTER
REFERRAL FOR [address],  at [facility]  [phone] phone  [phone] fax  [phone] Wes Gasca  at [facility]  [phone] phone  [phone] fax  [phone] cell

## 2019-07-26 PROBLEM — I10 ESSENTIAL HYPERTENSION: Chronic | Status: ACTIVE | Noted: 2019-07-26

## 2019-07-26 PROBLEM — D50.9 IRON DEFICIENCY ANEMIA: Status: ACTIVE | Noted: 2019-07-26

## 2019-07-26 PROBLEM — K21.9 GASTROESOPHAGEAL REFLUX DISEASE WITHOUT ESOPHAGITIS: Chronic | Status: ACTIVE | Noted: 2019-07-26

## 2019-07-26 PROBLEM — E11.65 TYPE 2 DIABETES MELLITUS WITH HYPERGLYCEMIA (HCC): Status: ACTIVE | Noted: 2019-07-26

## 2019-07-26 LAB
ANION GAP SERPL CALCULATED.3IONS-SCNC: 12 MMOL/L (ref 7–19)
BUN BLDV-MCNC: 20 MG/DL (ref 8–23)
CALCIUM SERPL-MCNC: 8.8 MG/DL (ref 8.8–10.2)
CHLORIDE BLD-SCNC: 103 MMOL/L (ref 98–111)
CO2: 26 MMOL/L (ref 22–29)
CREAT SERPL-MCNC: 0.7 MG/DL (ref 0.5–0.9)
GFR NON-AFRICAN AMERICAN: >60
GLUCOSE BLD-MCNC: 230 MG/DL (ref 70–99)
GLUCOSE BLD-MCNC: 234 MG/DL (ref 70–99)
GLUCOSE BLD-MCNC: 263 MG/DL (ref 70–99)
GLUCOSE BLD-MCNC: 263 MG/DL (ref 70–99)
GLUCOSE BLD-MCNC: 264 MG/DL (ref 74–109)
HCT VFR BLD CALC: 28.6 % (ref 37–47)
HEMOGLOBIN: 8.6 G/DL (ref 12–16)
INR BLD: 1.24 (ref 0.88–1.18)
PERFORMED ON: ABNORMAL
POTASSIUM REFLEX MAGNESIUM: 5 MMOL/L (ref 3.5–5)
PROTHROMBIN TIME: 15 SEC (ref 12–14.6)
SODIUM BLD-SCNC: 141 MMOL/L (ref 136–145)

## 2019-07-26 PROCEDURE — 6370000000 HC RX 637 (ALT 250 FOR IP): Performed by: ORTHOPAEDIC SURGERY

## 2019-07-26 PROCEDURE — 36415 COLL VENOUS BLD VENIPUNCTURE: CPT

## 2019-07-26 PROCEDURE — 85018 HEMOGLOBIN: CPT

## 2019-07-26 PROCEDURE — 1210000000 HC MED SURG R&B

## 2019-07-26 PROCEDURE — 97165 OT EVAL LOW COMPLEX 30 MIN: CPT

## 2019-07-26 PROCEDURE — 85014 HEMATOCRIT: CPT

## 2019-07-26 PROCEDURE — 94760 N-INVAS EAR/PLS OXIMETRY 1: CPT

## 2019-07-26 PROCEDURE — 97161 PT EVAL LOW COMPLEX 20 MIN: CPT

## 2019-07-26 PROCEDURE — 97530 THERAPEUTIC ACTIVITIES: CPT

## 2019-07-26 PROCEDURE — 80048 BASIC METABOLIC PNL TOTAL CA: CPT

## 2019-07-26 PROCEDURE — 97116 GAIT TRAINING THERAPY: CPT

## 2019-07-26 PROCEDURE — 82948 REAGENT STRIP/BLOOD GLUCOSE: CPT

## 2019-07-26 PROCEDURE — 2580000003 HC RX 258: Performed by: ORTHOPAEDIC SURGERY

## 2019-07-26 PROCEDURE — 85610 PROTHROMBIN TIME: CPT

## 2019-07-26 PROCEDURE — 6370000000 HC RX 637 (ALT 250 FOR IP): Performed by: PHYSICIAN ASSISTANT

## 2019-07-26 PROCEDURE — 6360000002 HC RX W HCPCS: Performed by: ORTHOPAEDIC SURGERY

## 2019-07-26 RX ORDER — DOCUSATE SODIUM 100 MG/1
100 CAPSULE, LIQUID FILLED ORAL DAILY
Qty: 20 CAPSULE | Refills: 0 | Status: SHIPPED | OUTPATIENT
Start: 2019-07-26 | End: 2019-11-05

## 2019-07-26 RX ORDER — CEPHALEXIN 500 MG/1
500 CAPSULE ORAL 4 TIMES DAILY
Qty: 28 CAPSULE | Refills: 0 | Status: SHIPPED | OUTPATIENT
Start: 2019-07-26 | End: 2019-11-05

## 2019-07-26 RX ORDER — OXYCODONE HYDROCHLORIDE 5 MG/1
5 TABLET ORAL EVERY 4 HOURS PRN
Qty: 35 TABLET | Refills: 0 | Status: SHIPPED | OUTPATIENT
Start: 2019-07-26 | End: 2019-07-29

## 2019-07-26 RX ORDER — INSULIN GLARGINE 100 [IU]/ML
20 INJECTION, SOLUTION SUBCUTANEOUS ONCE
Status: COMPLETED | OUTPATIENT
Start: 2019-07-26 | End: 2019-07-26

## 2019-07-26 RX ADMIN — ACETAMINOPHEN 650 MG: 325 TABLET ORAL at 10:11

## 2019-07-26 RX ADMIN — METFORMIN HYDROCHLORIDE 1000 MG: 500 TABLET ORAL at 08:49

## 2019-07-26 RX ADMIN — DOCUSATE SODIUM 100 MG: 100 CAPSULE, LIQUID FILLED ORAL at 22:35

## 2019-07-26 RX ADMIN — GLIPIZIDE 10 MG: 5 TABLET ORAL at 16:07

## 2019-07-26 RX ADMIN — FAMOTIDINE 20 MG: 20 TABLET ORAL at 22:35

## 2019-07-26 RX ADMIN — Medication 2 G: at 04:30

## 2019-07-26 RX ADMIN — WARFARIN SODIUM 12 MG: 5 TABLET ORAL at 17:09

## 2019-07-26 RX ADMIN — OXYCODONE HYDROCHLORIDE 5 MG: 5 TABLET ORAL at 22:32

## 2019-07-26 RX ADMIN — CELECOXIB 200 MG: 200 CAPSULE ORAL at 22:35

## 2019-07-26 RX ADMIN — ACETAMINOPHEN 650 MG: 325 TABLET ORAL at 16:07

## 2019-07-26 RX ADMIN — CELECOXIB 200 MG: 200 CAPSULE ORAL at 08:49

## 2019-07-26 RX ADMIN — ACETAMINOPHEN 650 MG: 325 TABLET ORAL at 22:32

## 2019-07-26 RX ADMIN — METFORMIN HYDROCHLORIDE 1000 MG: 500 TABLET ORAL at 17:09

## 2019-07-26 RX ADMIN — LEVOTHYROXINE SODIUM 200 MCG: 0.1 TABLET ORAL at 06:17

## 2019-07-26 RX ADMIN — INSULIN LISPRO 3 UNITS: 100 INJECTION, SOLUTION INTRAVENOUS; SUBCUTANEOUS at 17:09

## 2019-07-26 RX ADMIN — OXYCODONE HYDROCHLORIDE 10 MG: 5 TABLET ORAL at 00:22

## 2019-07-26 RX ADMIN — SIMVASTATIN 40 MG: 40 TABLET, FILM COATED ORAL at 22:35

## 2019-07-26 RX ADMIN — ACETAMINOPHEN 650 MG: 325 TABLET ORAL at 04:47

## 2019-07-26 RX ADMIN — DOCUSATE SODIUM 100 MG: 100 CAPSULE, LIQUID FILLED ORAL at 08:49

## 2019-07-26 RX ADMIN — PAROXETINE 20 MG: 20 TABLET, FILM COATED ORAL at 08:49

## 2019-07-26 RX ADMIN — SODIUM CHLORIDE, POTASSIUM CHLORIDE, SODIUM LACTATE AND CALCIUM CHLORIDE: 600; 310; 30; 20 INJECTION, SOLUTION INTRAVENOUS at 11:30

## 2019-07-26 RX ADMIN — OXYCODONE HYDROCHLORIDE 10 MG: 5 TABLET ORAL at 06:48

## 2019-07-26 RX ADMIN — GLIPIZIDE 10 MG: 5 TABLET ORAL at 06:17

## 2019-07-26 RX ADMIN — OXYCODONE HYDROCHLORIDE 10 MG: 5 TABLET ORAL at 14:19

## 2019-07-26 RX ADMIN — INSULIN LISPRO 1 UNITS: 100 INJECTION, SOLUTION INTRAVENOUS; SUBCUTANEOUS at 22:47

## 2019-07-26 RX ADMIN — INSULIN GLARGINE 20 UNITS: 100 INJECTION, SOLUTION SUBCUTANEOUS at 08:54

## 2019-07-26 ASSESSMENT — PAIN DESCRIPTION - ORIENTATION
ORIENTATION: RIGHT
ORIENTATION: RIGHT

## 2019-07-26 ASSESSMENT — PAIN SCALES - GENERAL
PAINLEVEL_OUTOF10: 5
PAINLEVEL_OUTOF10: 7
PAINLEVEL_OUTOF10: 9
PAINLEVEL_OUTOF10: 5
PAINLEVEL_OUTOF10: 2
PAINLEVEL_OUTOF10: 5
PAINLEVEL_OUTOF10: 7
PAINLEVEL_OUTOF10: 7
PAINLEVEL_OUTOF10: 9
PAINLEVEL_OUTOF10: 7

## 2019-07-26 ASSESSMENT — PAIN SCALES - WONG BAKER: WONGBAKER_NUMERICALRESPONSE: 6

## 2019-07-26 ASSESSMENT — PAIN DESCRIPTION - PAIN TYPE
TYPE: SURGICAL PAIN
TYPE: SURGICAL PAIN

## 2019-07-26 ASSESSMENT — PAIN DESCRIPTION - LOCATION
LOCATION: HIP
LOCATION: HIP

## 2019-07-26 NOTE — PLAN OF CARE
Problem: Falls - Risk of:  Goal: Will remain free from falls  Description  Will remain free from falls  7/26/2019 1152 by Maru Ramey RN  Outcome: Ongoing  7/25/2019 2333 by Yary Campbell RN  Outcome: Ongoing  Goal: Absence of physical injury  Description  Absence of physical injury  7/26/2019 1152 by Maru Ramey RN  Outcome: Ongoing  7/25/2019 2333 by Yary Campbell RN  Outcome: Ongoing     Problem: SAFETY  Goal: Free from accidental physical injury  7/26/2019 1152 by Maru Ramey RN  Outcome: Ongoing  7/25/2019 2333 by Yary Campbell RN  Outcome: Ongoing  Goal: Free from intentional harm  Outcome: Ongoing     Problem: DAILY CARE  Goal: Daily care needs are met  7/26/2019 1152 by Maru Ramey RN  Outcome: Ongoing  7/25/2019 2333 by Yary Campbell RN  Outcome: Ongoing     Problem: PAIN  Goal: Patient's pain/discomfort is manageable  7/26/2019 1152 by Maru Ramey RN  Outcome: Ongoing  7/25/2019 2333 by Yary Campbell RN  Outcome: Ongoing     Problem: SKIN INTEGRITY  Goal: Skin integrity is maintained or improved  7/26/2019 1152 by Maru Ramey RN  Outcome: Ongoing  7/25/2019 2333 by Yary Campbell RN  Outcome: Ongoing     Problem: KNOWLEDGE DEFICIT  Goal: Patient/S.O. demonstrates understanding of disease process, treatment plan, medications, and discharge instructions. 7/26/2019 1152 by Maru Ramey RN  Outcome: Ongoing  7/25/2019 2333 by Yary Campbell RN  Outcome: Ongoing     Problem: DISCHARGE BARRIERS  Goal: Patient's continuum of care needs are met  7/26/2019 1152 by Maru Ramey RN  Outcome: Ongoing  7/25/2019 2333 by Yary Campbell RN  Outcome: Ongoing     Problem:  Activity:  Goal: Ability to ambulate will improve  Description  Ability to ambulate will improve  7/26/2019 Isabella2 by Maru Ramey RN  Outcome: Ongoing  7/25/2019 2333 by Yary Campbell RN  Outcome: Ongoing  Goal: Ability to perform activities at highest level will improve  7/26/2019 1152 by Norma Espinoza RN  Outcome: Ongoing  7/25/2019 2333 by Josi Hernandez RN  Outcome: Ongoing     Problem: Self-Care:  Goal: Ability to meet self-care needs will improve  Description  Ability to meet self-care needs will improve  7/26/2019 1152 by Norma Espinoza RN  Outcome: Ongoing  7/25/2019 2333 by Josi Hernandez RN  Outcome: Ongoing     Problem: Self-Concept:  Goal: Ability to maintain and perform role responsibilities to the fullest extent possible will improve  Description  Ability to maintain and perform role responsibilities to the fullest extent possible will improve  7/26/2019 1152 by Norma Espinoza RN  Outcome: Ongoing  7/25/2019 2333 by Josi Hernandez RN  Outcome: Ongoing  Goal: Verbalizations of decreased anxiety will increase  Description  Verbalizations of decreased anxiety will increase  7/26/2019 1152 by Norma Espinoza RN  Outcome: Ongoing  7/25/2019 2333 by Josi Hernandez RN  Outcome: Ongoing     Problem: Sensory:  Goal: Pain level will decrease  Description  Pain level will decrease  7/26/2019 1152 by Norma Espinoza RN  Outcome: Ongoing  7/25/2019 2333 by Josi Hernandez RN  Outcome: Ongoing     Problem: Skin Integrity:  Goal: Demonstration of wound healing without infection will improve  Description  Demonstration of wound healing without infection will improve  7/26/2019 1152 by Norma Espinoza RN  Outcome: Ongoing  7/25/2019 2333 by Josi Hernandez RN  Outcome: Ongoing  Goal: Risk for impaired skin integrity will decrease  Description  Risk for impaired skin integrity will decrease  7/26/2019 1152 by Norma Espinoza RN  Outcome: Ongoing  7/25/2019 2333 by Josi Hernandez RN  Outcome: Ongoing     Problem: Tissue Perfusion:  Goal: Peripheral tissue perfusion will improve  Description  Peripheral tissue perfusion will improve  7/26/2019 1152 by Norma Espinoza RN  Outcome: Ongoing  7/25/2019 2333 by Josi Hernandez RN  Outcome: Ongoing  Goal: Risk

## 2019-07-27 LAB
ANION GAP SERPL CALCULATED.3IONS-SCNC: 13 MMOL/L (ref 7–19)
BUN BLDV-MCNC: 20 MG/DL (ref 8–23)
CALCIUM SERPL-MCNC: 8.3 MG/DL (ref 8.8–10.2)
CHLORIDE BLD-SCNC: 105 MMOL/L (ref 98–111)
CO2: 22 MMOL/L (ref 22–29)
CREAT SERPL-MCNC: 0.7 MG/DL (ref 0.5–0.9)
GFR NON-AFRICAN AMERICAN: >60
GLUCOSE BLD-MCNC: 235 MG/DL (ref 70–99)
GLUCOSE BLD-MCNC: 238 MG/DL (ref 70–99)
GLUCOSE BLD-MCNC: 270 MG/DL (ref 74–109)
GLUCOSE BLD-MCNC: 336 MG/DL (ref 70–99)
GLUCOSE BLD-MCNC: 469 MG/DL (ref 70–99)
HBA1C MFR BLD: 7.6 % (ref 4–6)
HCT VFR BLD CALC: 27.3 % (ref 37–47)
HEMOGLOBIN: 8 G/DL (ref 12–16)
INR BLD: 1.54 (ref 0.88–1.18)
IRON SATURATION: 5 % (ref 14–50)
IRON: 15 UG/DL (ref 37–145)
PERFORMED ON: ABNORMAL
POTASSIUM REFLEX MAGNESIUM: 4.3 MMOL/L (ref 3.5–5)
PROTHROMBIN TIME: 17.8 SEC (ref 12–14.6)
SODIUM BLD-SCNC: 140 MMOL/L (ref 136–145)
TOTAL IRON BINDING CAPACITY: 293 UG/DL (ref 250–400)

## 2019-07-27 PROCEDURE — 2700000000 HC OXYGEN THERAPY PER DAY

## 2019-07-27 PROCEDURE — 83036 HEMOGLOBIN GLYCOSYLATED A1C: CPT

## 2019-07-27 PROCEDURE — 36415 COLL VENOUS BLD VENIPUNCTURE: CPT

## 2019-07-27 PROCEDURE — 83540 ASSAY OF IRON: CPT

## 2019-07-27 PROCEDURE — 82948 REAGENT STRIP/BLOOD GLUCOSE: CPT

## 2019-07-27 PROCEDURE — 85014 HEMATOCRIT: CPT

## 2019-07-27 PROCEDURE — 85018 HEMOGLOBIN: CPT

## 2019-07-27 PROCEDURE — 83550 IRON BINDING TEST: CPT

## 2019-07-27 PROCEDURE — 6370000000 HC RX 637 (ALT 250 FOR IP): Performed by: ORTHOPAEDIC SURGERY

## 2019-07-27 PROCEDURE — 80048 BASIC METABOLIC PNL TOTAL CA: CPT

## 2019-07-27 PROCEDURE — 1210000000 HC MED SURG R&B

## 2019-07-27 PROCEDURE — 2580000003 HC RX 258: Performed by: ORTHOPAEDIC SURGERY

## 2019-07-27 PROCEDURE — 85610 PROTHROMBIN TIME: CPT

## 2019-07-27 RX ADMIN — INSULIN LISPRO 4 UNITS: 100 INJECTION, SOLUTION INTRAVENOUS; SUBCUTANEOUS at 13:01

## 2019-07-27 RX ADMIN — PAROXETINE 20 MG: 20 TABLET, FILM COATED ORAL at 08:56

## 2019-07-27 RX ADMIN — SIMVASTATIN 40 MG: 40 TABLET, FILM COATED ORAL at 21:08

## 2019-07-27 RX ADMIN — ACETAMINOPHEN 650 MG: 325 TABLET ORAL at 09:56

## 2019-07-27 RX ADMIN — GLIPIZIDE 10 MG: 5 TABLET ORAL at 08:55

## 2019-07-27 RX ADMIN — OXYCODONE HYDROCHLORIDE 10 MG: 5 TABLET ORAL at 03:17

## 2019-07-27 RX ADMIN — HYDROCHLOROTHIAZIDE 12.5 MG: 12.5 CAPSULE ORAL at 08:56

## 2019-07-27 RX ADMIN — FAMOTIDINE 20 MG: 20 TABLET ORAL at 21:08

## 2019-07-27 RX ADMIN — ACETAMINOPHEN 650 MG: 325 TABLET ORAL at 17:34

## 2019-07-27 RX ADMIN — CELECOXIB 200 MG: 200 CAPSULE ORAL at 08:56

## 2019-07-27 RX ADMIN — DOCUSATE SODIUM 100 MG: 100 CAPSULE, LIQUID FILLED ORAL at 21:08

## 2019-07-27 RX ADMIN — OXYCODONE HYDROCHLORIDE 10 MG: 5 TABLET ORAL at 16:42

## 2019-07-27 RX ADMIN — METFORMIN HYDROCHLORIDE 1000 MG: 500 TABLET ORAL at 17:35

## 2019-07-27 RX ADMIN — LEVOTHYROXINE SODIUM 200 MCG: 0.1 TABLET ORAL at 06:11

## 2019-07-27 RX ADMIN — WARFARIN SODIUM 12 MG: 5 TABLET ORAL at 17:35

## 2019-07-27 RX ADMIN — INSULIN LISPRO 6 UNITS: 100 INJECTION, SOLUTION INTRAVENOUS; SUBCUTANEOUS at 18:05

## 2019-07-27 RX ADMIN — OXYCODONE HYDROCHLORIDE 10 MG: 5 TABLET ORAL at 21:08

## 2019-07-27 RX ADMIN — DOCUSATE SODIUM 100 MG: 100 CAPSULE, LIQUID FILLED ORAL at 08:57

## 2019-07-27 RX ADMIN — LOSARTAN POTASSIUM 100 MG: 100 TABLET ORAL at 08:55

## 2019-07-27 RX ADMIN — ACETAMINOPHEN 650 MG: 325 TABLET ORAL at 03:17

## 2019-07-27 RX ADMIN — Medication 10 ML: at 10:02

## 2019-07-27 RX ADMIN — OXYCODONE HYDROCHLORIDE 5 MG: 5 TABLET ORAL at 09:56

## 2019-07-27 RX ADMIN — CELECOXIB 200 MG: 200 CAPSULE ORAL at 21:08

## 2019-07-27 RX ADMIN — ACETAMINOPHEN 650 MG: 325 TABLET ORAL at 21:08

## 2019-07-27 RX ADMIN — INSULIN LISPRO 2 UNITS: 100 INJECTION, SOLUTION INTRAVENOUS; SUBCUTANEOUS at 08:57

## 2019-07-27 RX ADMIN — GLIPIZIDE 10 MG: 5 TABLET ORAL at 17:35

## 2019-07-27 RX ADMIN — Medication 10 ML: at 22:14

## 2019-07-27 RX ADMIN — METFORMIN HYDROCHLORIDE 1000 MG: 500 TABLET ORAL at 08:55

## 2019-07-27 ASSESSMENT — PAIN SCALES - GENERAL
PAINLEVEL_OUTOF10: 5
PAINLEVEL_OUTOF10: 0
PAINLEVEL_OUTOF10: 7
PAINLEVEL_OUTOF10: 7
PAINLEVEL_OUTOF10: 5
PAINLEVEL_OUTOF10: 7
PAINLEVEL_OUTOF10: 7

## 2019-07-27 NOTE — PROGRESS NOTES
Occupational Therapy   Occupational Therapy Initial Assessment  Date: 2019   Patient Name: Yoan Weber  MRN: [id number]     : 1940    Date of Service: 2019    Discharge Recommendations:          Assessment   Performance deficits / Impairments: Decreased functional mobility ; Decreased ADL status; Decreased endurance;Decreased balance  Assessment: Will progress as tolerated  Treatment Diagnosis: R THR (anterior)  Prognosis: Good  Decision Making: Low Complexity  REQUIRES OT FOLLOW UP: Yes  Activity Tolerance  Activity Tolerance: Patient Tolerated treatment well           Patient Diagnosis(es): The encounter diagnosis was Primary osteoarthritis of right hip.     has a past medical history of Acid reflux, Arthritis, Diabetes mellitus ([facility] [address] Hx of blood clots, Hyperlipidemia, Hypertension, and Thyroid disease. has a past surgical history that includes Total knee arthroplasty (Bilateral, ); Lithotripsy; Cholecystectomy; Tubal ligation; Total Thyroidectomy; and eye surgery (Bilateral).     Treatment Diagnosis: R THR (anterior)      Restrictions  Restrictions/Precautions  Restrictions/Precautions: Weight Bearing(TTWB x 2 weeks then  WBAT)  Lower Extremity Weight Bearing Restrictions  Right Lower Extremity Weight Bearing: Weight Bearing As Tolerated    Subjective   General  Chart Reviewed: Yes  Patient assessed for rehabilitation services?: Yes  Diagnosis: R THR  Pain Assessment  Pain Assessment: 0-10  Pain Level: 7  Pain Type: Surgical pain  Pain Location: Hip  Pain Orientation: Right  Vital Signs  Temp: 97.4 °F (36.3 °C)  Temp Source: Temporal  Pulse: 69  Heart Rate Source: Monitor  Resp: 16  BP: (!) 104/58  BP Location: Left Arm  BP Upper/Lower: Lower  Level of Consciousness: Alert  MEWS Score: 1  Oxygen Therapy  SpO2: 95 %  O2 Device: None (Room air)  Social/Functional History  Social/Functional History  Lives With: Spouse  Type of Home: House  Home Layout: One level  Home Access: Stairs
Physical Therapy    [facility] PHYSICAL THERAPY EVALUATION      Sherif GuzmanSelinDaxa    : 1940  MRN: [id number]   PHYSICIAN:  Sam Jeffrey,*  Primary Problem    Patient Active Problem List   Diagnosis    Primary osteoarthritis of right hip    Iron deficiency anemia    Type 2 diabetes mellitus with hyperglycemia ([facility] [address]    Essential hypertension    Gastroesophageal reflux disease without esophagitis       Rehabilitation Diagnosis:     Unilateral primary osteoarthritis, right hip [M16.11]  Primary osteoarthritis of right hip [M16.11]   Decline in mobility    SERVICE DATE: 2019    DIAGNOSIS:     [] Left Total hip   [] Left Total knee   [x] Right Total hip   [] Right total knee    SUBJECTIVE: Agrees to work with therapy    OBJECTIVE:  Orientation        [x] Within normal limits       [] Follows directions one step at a time    [] Unable to follow directions    [] Unable to participate in therapy    ROM     [] Active     [x] Passive     HIP   LEFT:   [x] WFL Flexion:  Extension:      RIGHT: [] WFL Flexion: 70 Extension: 0    KNEE   LEFT:   [x] WFL Flexion:  Extension:   RIGHT: [x] WFL Flexion:  Extension:     STRENGTH    HIP   LEFT:   [x] WFL [] Grossly 3-/5     RIGHT: [] WFL [x] Grossly 3-/5    KNEE   LEFT:   [x] WFL [] Grossly 3-/5   RIGHT: [] WFL [x] Grossly 3-/5     TRANSFERS   Sit to stand     [] CGA   [] Independent [] Modified Independent [] Stand by / Supervision   [] Minimum  [x] Moderate   [] Maximum      Bed to chair     [] CGA   [] Independent [] Modified Independent [] Stand by / Supervision   [x] Minimum  [] Moderate   [] Maximum      Bed mobility   Supine to sit     [] Independent [] Modified Independent [] Stand by / Supervision   [x] Minimum  [] Moderate   [] Maximum        Roll  [] Left  [] Right    [] Independent [] Modified Independent [] Stand by / Supervision   [] Minimum  [] Moderate   [] Maximum      Scoot  [x] Side to side  [] Up and down   [] Independent [] Modified
intake/output:    Intake/Output Summary (Last 24 hours) at 7/27/2019 1230  Last data filed at 7/27/2019 1013  Gross per 24 hour   Intake 525 ml   Output 1600 ml   Net -1075 ml     Last 3 weights: Wt Readings from Last 3 Encounters:   07/25/19 250 lb (113.4 kg)   07/05/19 250 lb (113.4 kg)       Physical Exam:    General Appearance:  awake, alert, oriented, in no acute distress  Skin: Incision intact  Eyes:  No gross abnormalities. Neck:  neck- supple, no mass, non-tender  Lungs:  Breathing Pattern: regular, no distress, Breath sounds: normal  Heart:  Heart regular rate and rhythm  Abdomen: Auscultation: Normal bowel sounds. No bruits. Palpation: No masses, tenderness or organomegally.   Extremities: pulses present in all extremities  Musculoskeletal: Limited right hip range of motion  Neurologic:  negative    CBC with Differential:    Lab Results   Component Value Date    WBC 7.2 07/05/2019    RBC 4.56 07/05/2019    HGB 8.0 07/27/2019    HCT 27.3 07/27/2019     07/05/2019    MCV 78.3 07/05/2019    MCH 22.8 07/05/2019    MCHC 29.1 07/05/2019    RDW 17.3 07/05/2019    LYMPHOPCT 22.1 07/05/2019    MONOPCT 7.7 07/05/2019    BASOPCT 0.8 07/05/2019    MONOSABS 0.60 07/05/2019    LYMPHSABS 1.6 07/05/2019    EOSABS 0.40 07/05/2019    BASOSABS 0.10 07/05/2019     CMP:    Lab Results   Component Value Date     07/27/2019    K 4.3 07/27/2019     07/27/2019    CO2 22 07/27/2019    BUN 20 07/27/2019    CREATININE 0.7 07/27/2019    LABGLOM >60 07/27/2019    GLUCOSE 270 07/27/2019    PROT 5.5 09/24/2012    LABALBU 3.4 09/24/2012    CALCIUM 8.3 07/27/2019    ALKPHOS 48 09/24/2012    AST 49 09/24/2012    ALT 46 09/24/2012     Last 3 Troponin:  No results found for: TROPONINI  Urine Culture:  No components found for: CURINE  Blood Culture:  No components found for: CBLOOD, CFUNGUSBL  Stool Culture:  No components found for: CSTOOL    Assessment:    Active Problems:    Primary osteoarthritis of right hip    Iron

## 2019-07-28 VITALS
HEART RATE: 100 BPM | DIASTOLIC BLOOD PRESSURE: 79 MMHG | RESPIRATION RATE: 16 BRPM | OXYGEN SATURATION: 96 % | HEIGHT: 69 IN | TEMPERATURE: 98.2 F | SYSTOLIC BLOOD PRESSURE: 134 MMHG | WEIGHT: 250 LBS | BODY MASS INDEX: 37.03 KG/M2

## 2019-07-28 LAB
ANION GAP SERPL CALCULATED.3IONS-SCNC: 11 MMOL/L (ref 7–19)
BUN BLDV-MCNC: 17 MG/DL (ref 8–23)
CALCIUM SERPL-MCNC: 8.5 MG/DL (ref 8.8–10.2)
CHLORIDE BLD-SCNC: 104 MMOL/L (ref 98–111)
CO2: 25 MMOL/L (ref 22–29)
CREAT SERPL-MCNC: 0.6 MG/DL (ref 0.5–0.9)
GFR NON-AFRICAN AMERICAN: >60
GLUCOSE BLD-MCNC: 167 MG/DL (ref 74–109)
GLUCOSE BLD-MCNC: 240 MG/DL (ref 70–99)
GLUCOSE BLD-MCNC: 337 MG/DL (ref 70–99)
HCT VFR BLD CALC: 27.1 % (ref 37–47)
HEMOGLOBIN: 7.8 G/DL (ref 12–16)
INR BLD: 1.61 (ref 0.88–1.18)
PERFORMED ON: ABNORMAL
PERFORMED ON: ABNORMAL
POTASSIUM REFLEX MAGNESIUM: 3.9 MMOL/L (ref 3.5–5)
PROTHROMBIN TIME: 18.4 SEC (ref 12–14.6)
SODIUM BLD-SCNC: 140 MMOL/L (ref 136–145)

## 2019-07-28 PROCEDURE — 85014 HEMATOCRIT: CPT

## 2019-07-28 PROCEDURE — 2580000003 HC RX 258: Performed by: ORTHOPAEDIC SURGERY

## 2019-07-28 PROCEDURE — 80048 BASIC METABOLIC PNL TOTAL CA: CPT

## 2019-07-28 PROCEDURE — 82948 REAGENT STRIP/BLOOD GLUCOSE: CPT

## 2019-07-28 PROCEDURE — 6370000000 HC RX 637 (ALT 250 FOR IP): Performed by: ORTHOPAEDIC SURGERY

## 2019-07-28 PROCEDURE — 85610 PROTHROMBIN TIME: CPT

## 2019-07-28 PROCEDURE — 2700000000 HC OXYGEN THERAPY PER DAY

## 2019-07-28 PROCEDURE — 85018 HEMOGLOBIN: CPT

## 2019-07-28 PROCEDURE — 36415 COLL VENOUS BLD VENIPUNCTURE: CPT

## 2019-07-28 RX ADMIN — METFORMIN HYDROCHLORIDE 1000 MG: 500 TABLET ORAL at 10:20

## 2019-07-28 RX ADMIN — ACETAMINOPHEN 650 MG: 325 TABLET ORAL at 09:34

## 2019-07-28 RX ADMIN — HYDROCHLOROTHIAZIDE 12.5 MG: 12.5 CAPSULE ORAL at 09:34

## 2019-07-28 RX ADMIN — PAROXETINE 20 MG: 20 TABLET, FILM COATED ORAL at 09:34

## 2019-07-28 RX ADMIN — LOSARTAN POTASSIUM 100 MG: 100 TABLET ORAL at 09:34

## 2019-07-28 RX ADMIN — CELECOXIB 200 MG: 200 CAPSULE ORAL at 09:33

## 2019-07-28 RX ADMIN — GLIPIZIDE 10 MG: 5 TABLET ORAL at 10:20

## 2019-07-28 RX ADMIN — Medication 10 ML: at 09:00

## 2019-07-28 RX ADMIN — LEVOTHYROXINE SODIUM 200 MCG: 0.1 TABLET ORAL at 06:25

## 2019-07-28 RX ADMIN — DOCUSATE SODIUM 100 MG: 100 CAPSULE, LIQUID FILLED ORAL at 09:34

## 2019-07-28 RX ADMIN — OXYCODONE HYDROCHLORIDE 10 MG: 5 TABLET ORAL at 09:34

## 2019-07-28 RX ADMIN — INSULIN LISPRO 2 UNITS: 100 INJECTION, SOLUTION INTRAVENOUS; SUBCUTANEOUS at 09:33

## 2019-07-28 ASSESSMENT — PAIN SCALES - GENERAL: PAINLEVEL_OUTOF10: 5

## 2019-07-28 NOTE — DISCHARGE SUMMARY
NAME: Swati Weber  : 1940  MRN: [id number]      Admission Date: 2019    Discharge Date: 2019    Final Diagnoses: Unilateral primary osteoarthritis, right hip [M16.11]  Primary osteoarthritis of right hip [M16.11]    Procedures: right total hip replacement    Consultations: Dr. Andrés Stanton    Reason for Admission: Patient presents for the above procedure having failed conservative treatment. Patient consents to the procedure above understanding the risks of bleeding, infection, anesthesia, nerve injury, stiffness, and blood clots. Hospital Course: The patient was admitted with the above named diagnosis, surgery was performed and tolerated well. At the time of discharge, the patient was afebrile, vitals stable, pain was controlled with oral medication, they were tolerating a by mouth diet, and voiding appropriately. Physical therapy and occupational therapy were consulted. Given these findings they were deemed suitable to be discharged. Disposition: Skilled nursing facility    Activity: Toe touch weight bearing right lower    Wound Instructions: see postop instructions    Diet: diabetic diet    Resume home meds:   Prior to Admission medications    Medication Sig Start Date End Date Taking? Authorizing Provider   oxyCODONE (ROXICODONE) 5 MG immediate release tablet Take 1 tablet by mouth every 4 hours as needed for Pain for up to 3 days.  19 Yes Eris Richards MD   docusate sodium (COLACE) 100 MG capsule Take 1 capsule by mouth daily To prevent constipation 19  Yes Eris Richards MD   cephALEXin [facility]) 500 MG capsule Take 1 capsule by mouth 4 times daily 19  Yes Eris Richards MD   simvastatin (ZOCOR) 40 MG tablet Take 40 mg by mouth nightly   Yes Historical Provider, MD   glipiZIDE (GLUCOTROL) 10 MG tablet Take 10 mg by mouth 2 times daily (before meals)   Yes Historical Provider, MD   losartan (COZAAR) 100 MG tablet Take 100 mg by mouth daily   Yes Historical Provider, MD   levothyroxine (SYNTHROID) 200 MCG tablet Take 200 mcg by mouth Daily   Yes Historical Provider, MD   nabumetone (RELAFEN) 500 MG tablet Take 500 mg by mouth 3 times daily   Yes Historical Provider, MD   PARoxetine (PAXIL) 20 MG tablet Take 20 mg by mouth every morning   Yes Historical Provider, MD   metFORMIN (GLUCOPHAGE) 1000 MG tablet Take 1,000 mg by mouth 2 times daily (with meals)   Yes Historical Provider, MD   ranitidine (RANITIDINE 150 MAX STRENGTH) 150 MG tablet Take 150 mg by mouth 2 times daily   Yes Historical Provider, MD   hydrochlorothiazide (MICROZIDE) 12.5 MG capsule Take 12.5 mg by mouth every morning   Yes Historical Provider, MD   warfarin (COUMADIN) 10 MG tablet Take 10 mg by mouth daily TAKES A TOTAL OF 12 MG PER DAY    Historical Provider, MD   warfarin (COUMADIN) 1 MG tablet Take 2 mg by mouth daily TAKES A TOTAL OF 12 MG PER DAY    Historical Provider, MD       Prescriptions for:  Percocet 5/325    Return to Clinic: 4 weeks    Xrays:  Yes     Electronically signed by Bernadette Zarco PA-C on 7/28/2019 at 10:39 AM

## 2019-08-12 ENCOUNTER — TELEPHONE (OUTPATIENT)
Dept: INPATIENT UNIT | Age: 79
End: 2019-08-12

## 2019-08-12 NOTE — TELEPHONE ENCOUNTER
Follow up phone call x 1 attempt. Left a message. Expecting a return call.   Electronically signed by Ata Jackson RN on 8/12/2019 at 1:12 PM

## 2019-11-05 ENCOUNTER — OFFICE VISIT (OUTPATIENT)
Dept: HEMATOLOGY | Age: 79
End: 2019-11-05
Payer: MEDICARE

## 2019-11-05 VITALS
HEART RATE: 106 BPM | SYSTOLIC BLOOD PRESSURE: 138 MMHG | DIASTOLIC BLOOD PRESSURE: 70 MMHG | BODY MASS INDEX: 37.47 KG/M2 | WEIGHT: 253 LBS | OXYGEN SATURATION: 95 % | HEIGHT: 69 IN

## 2019-11-05 DIAGNOSIS — D64.9 FATIGUE ASSOCIATED WITH ANEMIA: ICD-10-CM

## 2019-11-05 DIAGNOSIS — D50.9 IRON DEFICIENCY ANEMIA, UNSPECIFIED IRON DEFICIENCY ANEMIA TYPE: Primary | ICD-10-CM

## 2019-11-05 DIAGNOSIS — Z86.718 HISTORY OF DVT (DEEP VEIN THROMBOSIS): ICD-10-CM

## 2019-11-05 DIAGNOSIS — Z12.31 ENCOUNTER FOR SCREENING MAMMOGRAM FOR MALIGNANT NEOPLASM OF BREAST: ICD-10-CM

## 2019-11-05 PROCEDURE — 1123F ACP DISCUSS/DSCN MKR DOCD: CPT | Performed by: INTERNAL MEDICINE

## 2019-11-05 PROCEDURE — 1090F PRES/ABSN URINE INCON ASSESS: CPT | Performed by: INTERNAL MEDICINE

## 2019-11-05 PROCEDURE — G8427 DOCREV CUR MEDS BY ELIG CLIN: HCPCS | Performed by: INTERNAL MEDICINE

## 2019-11-05 PROCEDURE — G8400 PT W/DXA NO RESULTS DOC: HCPCS | Performed by: INTERNAL MEDICINE

## 2019-11-05 PROCEDURE — 99205 OFFICE O/P NEW HI 60 MIN: CPT | Performed by: INTERNAL MEDICINE

## 2019-11-05 PROCEDURE — G8484 FLU IMMUNIZE NO ADMIN: HCPCS | Performed by: INTERNAL MEDICINE

## 2019-11-05 PROCEDURE — 4040F PNEUMOC VAC/ADMIN/RCVD: CPT | Performed by: INTERNAL MEDICINE

## 2019-11-05 PROCEDURE — 1036F TOBACCO NON-USER: CPT | Performed by: INTERNAL MEDICINE

## 2019-11-05 PROCEDURE — G8417 CALC BMI ABV UP PARAM F/U: HCPCS | Performed by: INTERNAL MEDICINE

## 2019-11-14 ENCOUNTER — TELEPHONE (OUTPATIENT)
Dept: INFUSION THERAPY | Age: 79
End: 2019-11-14

## 2019-11-14 DIAGNOSIS — D50.9 IRON DEFICIENCY ANEMIA, UNSPECIFIED IRON DEFICIENCY ANEMIA TYPE: Primary | ICD-10-CM

## 2019-11-14 RX ORDER — HEPARIN SODIUM (PORCINE) LOCK FLUSH IV SOLN 100 UNIT/ML 100 UNIT/ML
500 SOLUTION INTRAVENOUS PRN
Status: CANCELLED | OUTPATIENT
Start: 2019-11-14

## 2019-11-14 RX ORDER — METHYLPREDNISOLONE SODIUM SUCCINATE 125 MG/2ML
125 INJECTION, POWDER, LYOPHILIZED, FOR SOLUTION INTRAMUSCULAR; INTRAVENOUS ONCE
Status: CANCELLED | OUTPATIENT
Start: 2019-11-14

## 2019-11-14 RX ORDER — SODIUM CHLORIDE 9 MG/ML
INJECTION, SOLUTION INTRAVENOUS CONTINUOUS
Status: CANCELLED | OUTPATIENT
Start: 2019-11-14

## 2019-11-14 RX ORDER — SODIUM CHLORIDE 0.9 % (FLUSH) 0.9 %
5 SYRINGE (ML) INJECTION PRN
Status: CANCELLED | OUTPATIENT
Start: 2019-11-14

## 2019-11-14 RX ORDER — EPINEPHRINE 1 MG/ML
0.3 INJECTION, SOLUTION, CONCENTRATE INTRAVENOUS PRN
Status: CANCELLED | OUTPATIENT
Start: 2019-11-14

## 2019-11-14 RX ORDER — DIPHENHYDRAMINE HYDROCHLORIDE 50 MG/ML
50 INJECTION INTRAMUSCULAR; INTRAVENOUS ONCE
Status: CANCELLED | OUTPATIENT
Start: 2019-11-14

## 2019-11-14 RX ORDER — FERROUS SULFATE 325(65) MG
325 TABLET ORAL 2 TIMES DAILY
Qty: 60 TABLET | Refills: 5 | Status: SHIPPED | OUTPATIENT
Start: 2019-11-14 | End: 2020-06-23

## 2019-11-14 RX ORDER — SODIUM CHLORIDE 0.9 % (FLUSH) 0.9 %
10 SYRINGE (ML) INJECTION PRN
Status: CANCELLED | OUTPATIENT
Start: 2019-11-14

## 2019-11-26 ENCOUNTER — HOSPITAL ENCOUNTER (OUTPATIENT)
Dept: INFUSION THERAPY | Age: 79
Setting detail: INFUSION SERIES
Discharge: HOME | End: 2019-11-26
Payer: MEDICARE

## 2019-11-26 VITALS
SYSTOLIC BLOOD PRESSURE: 136 MMHG | TEMPERATURE: 97.6 F | DIASTOLIC BLOOD PRESSURE: 72 MMHG | HEART RATE: 80 BPM | RESPIRATION RATE: 18 BRPM

## 2019-11-26 DIAGNOSIS — D50.9 IRON DEFICIENCY ANEMIA, UNSPECIFIED IRON DEFICIENCY ANEMIA TYPE: Primary | ICD-10-CM

## 2019-11-26 PROCEDURE — 6360000002 HC RX W HCPCS: Performed by: INTERNAL MEDICINE

## 2019-11-26 PROCEDURE — 2580000003 HC RX 258: Performed by: INTERNAL MEDICINE

## 2019-11-26 PROCEDURE — 96365 THER/PROPH/DIAG IV INF INIT: CPT

## 2019-11-26 RX ORDER — DIPHENHYDRAMINE HYDROCHLORIDE 50 MG/ML
50 INJECTION INTRAMUSCULAR; INTRAVENOUS ONCE
Status: CANCELLED | OUTPATIENT
Start: 2019-12-03

## 2019-11-26 RX ORDER — SODIUM CHLORIDE 9 MG/ML
INJECTION, SOLUTION INTRAVENOUS CONTINUOUS
Status: ACTIVE | OUTPATIENT
Start: 2019-11-26 | End: 2019-11-26

## 2019-11-26 RX ORDER — SODIUM CHLORIDE 0.9 % (FLUSH) 0.9 %
5 SYRINGE (ML) INJECTION PRN
Status: CANCELLED | OUTPATIENT
Start: 2019-12-03

## 2019-11-26 RX ORDER — EPINEPHRINE 1 MG/ML
0.3 INJECTION, SOLUTION, CONCENTRATE INTRAVENOUS PRN
Status: CANCELLED | OUTPATIENT
Start: 2019-12-03

## 2019-11-26 RX ORDER — SODIUM CHLORIDE 9 MG/ML
INJECTION, SOLUTION INTRAVENOUS CONTINUOUS
Status: CANCELLED | OUTPATIENT
Start: 2019-12-03

## 2019-11-26 RX ORDER — SODIUM CHLORIDE 0.9 % (FLUSH) 0.9 %
10 SYRINGE (ML) INJECTION PRN
Status: CANCELLED | OUTPATIENT
Start: 2019-12-03

## 2019-11-26 RX ORDER — METHYLPREDNISOLONE SODIUM SUCCINATE 125 MG/2ML
125 INJECTION, POWDER, LYOPHILIZED, FOR SOLUTION INTRAMUSCULAR; INTRAVENOUS ONCE
Status: CANCELLED | OUTPATIENT
Start: 2019-12-03

## 2019-11-26 RX ORDER — HEPARIN SODIUM (PORCINE) LOCK FLUSH IV SOLN 100 UNIT/ML 100 UNIT/ML
500 SOLUTION INTRAVENOUS PRN
Status: CANCELLED | OUTPATIENT
Start: 2019-12-03

## 2019-11-26 RX ADMIN — FERRIC CARBOXYMALTOSE INJECTION 750 MG: 50 INJECTION, SOLUTION INTRAVENOUS at 11:33

## 2019-12-03 ENCOUNTER — OFFICE VISIT (OUTPATIENT)
Dept: HEMATOLOGY | Age: 79
End: 2019-12-03
Payer: MEDICARE

## 2019-12-03 VITALS
BODY MASS INDEX: 37.03 KG/M2 | DIASTOLIC BLOOD PRESSURE: 78 MMHG | HEART RATE: 97 BPM | HEIGHT: 69 IN | WEIGHT: 250 LBS | OXYGEN SATURATION: 97 % | SYSTOLIC BLOOD PRESSURE: 146 MMHG

## 2019-12-03 DIAGNOSIS — I10 HYPERTENSION, UNSPECIFIED TYPE: Primary | ICD-10-CM

## 2019-12-03 DIAGNOSIS — Z71.89 CARE PLAN DISCUSSED WITH PATIENT: ICD-10-CM

## 2019-12-03 DIAGNOSIS — E61.1 IRON DEFICIENCY: ICD-10-CM

## 2019-12-03 DIAGNOSIS — D64.9 FATIGUE ASSOCIATED WITH ANEMIA: ICD-10-CM

## 2019-12-03 DIAGNOSIS — D50.9 IRON DEFICIENCY ANEMIA, UNSPECIFIED IRON DEFICIENCY ANEMIA TYPE: ICD-10-CM

## 2019-12-03 DIAGNOSIS — I82.890 SUPERFICIAL VEIN THROMBOSIS: ICD-10-CM

## 2019-12-03 DIAGNOSIS — Z79.01 CHRONIC ANTICOAGULATION: ICD-10-CM

## 2019-12-03 PROCEDURE — G8484 FLU IMMUNIZE NO ADMIN: HCPCS | Performed by: INTERNAL MEDICINE

## 2019-12-03 PROCEDURE — G8417 CALC BMI ABV UP PARAM F/U: HCPCS | Performed by: INTERNAL MEDICINE

## 2019-12-03 PROCEDURE — 1090F PRES/ABSN URINE INCON ASSESS: CPT | Performed by: INTERNAL MEDICINE

## 2019-12-03 PROCEDURE — 1123F ACP DISCUSS/DSCN MKR DOCD: CPT | Performed by: INTERNAL MEDICINE

## 2019-12-03 PROCEDURE — G8427 DOCREV CUR MEDS BY ELIG CLIN: HCPCS | Performed by: INTERNAL MEDICINE

## 2019-12-03 PROCEDURE — 1036F TOBACCO NON-USER: CPT | Performed by: INTERNAL MEDICINE

## 2019-12-03 PROCEDURE — G8400 PT W/DXA NO RESULTS DOC: HCPCS | Performed by: INTERNAL MEDICINE

## 2019-12-03 PROCEDURE — 4040F PNEUMOC VAC/ADMIN/RCVD: CPT | Performed by: INTERNAL MEDICINE

## 2019-12-03 PROCEDURE — 99214 OFFICE O/P EST MOD 30 MIN: CPT | Performed by: INTERNAL MEDICINE

## 2019-12-04 ENCOUNTER — HOSPITAL ENCOUNTER (OUTPATIENT)
Dept: INFUSION THERAPY | Age: 79
Setting detail: INFUSION SERIES
Discharge: HOME | End: 2019-12-04
Payer: MEDICARE

## 2019-12-04 VITALS
SYSTOLIC BLOOD PRESSURE: 97 MMHG | HEART RATE: 74 BPM | RESPIRATION RATE: 18 BRPM | DIASTOLIC BLOOD PRESSURE: 58 MMHG | TEMPERATURE: 97.5 F | OXYGEN SATURATION: 95 %

## 2019-12-04 DIAGNOSIS — D50.9 IRON DEFICIENCY ANEMIA, UNSPECIFIED IRON DEFICIENCY ANEMIA TYPE: Primary | ICD-10-CM

## 2019-12-04 PROCEDURE — 96365 THER/PROPH/DIAG IV INF INIT: CPT

## 2019-12-04 PROCEDURE — 6360000002 HC RX W HCPCS: Performed by: INTERNAL MEDICINE

## 2019-12-04 PROCEDURE — 2580000003 HC RX 258: Performed by: INTERNAL MEDICINE

## 2019-12-04 RX ORDER — EPINEPHRINE 1 MG/ML
0.3 INJECTION, SOLUTION, CONCENTRATE INTRAVENOUS PRN
Status: CANCELLED | OUTPATIENT
Start: 2019-12-04

## 2019-12-04 RX ORDER — METHYLPREDNISOLONE SODIUM SUCCINATE 125 MG/2ML
125 INJECTION, POWDER, LYOPHILIZED, FOR SOLUTION INTRAMUSCULAR; INTRAVENOUS ONCE
Status: CANCELLED | OUTPATIENT
Start: 2019-12-04

## 2019-12-04 RX ORDER — SODIUM CHLORIDE 0.9 % (FLUSH) 0.9 %
5 SYRINGE (ML) INJECTION PRN
Status: CANCELLED | OUTPATIENT
Start: 2019-12-04

## 2019-12-04 RX ORDER — SODIUM CHLORIDE 0.9 % (FLUSH) 0.9 %
10 SYRINGE (ML) INJECTION PRN
Status: CANCELLED | OUTPATIENT
Start: 2019-12-04

## 2019-12-04 RX ORDER — SODIUM CHLORIDE 9 MG/ML
INJECTION, SOLUTION INTRAVENOUS CONTINUOUS
Status: CANCELLED | OUTPATIENT
Start: 2019-12-04

## 2019-12-04 RX ORDER — HEPARIN SODIUM (PORCINE) LOCK FLUSH IV SOLN 100 UNIT/ML 100 UNIT/ML
500 SOLUTION INTRAVENOUS PRN
Status: CANCELLED | OUTPATIENT
Start: 2019-12-04

## 2019-12-04 RX ORDER — SODIUM CHLORIDE 9 MG/ML
INJECTION, SOLUTION INTRAVENOUS CONTINUOUS
Status: ACTIVE | OUTPATIENT
Start: 2019-12-04 | End: 2019-12-04

## 2019-12-04 RX ORDER — DIPHENHYDRAMINE HYDROCHLORIDE 50 MG/ML
50 INJECTION INTRAMUSCULAR; INTRAVENOUS ONCE
Status: CANCELLED | OUTPATIENT
Start: 2019-12-04

## 2019-12-04 RX ADMIN — SODIUM CHLORIDE: 9 INJECTION, SOLUTION INTRAVENOUS at 11:02

## 2019-12-04 RX ADMIN — FERRIC CARBOXYMALTOSE INJECTION 750 MG: 50 INJECTION, SOLUTION INTRAVENOUS at 11:07

## 2020-06-23 ENCOUNTER — OFFICE VISIT (OUTPATIENT)
Dept: HEMATOLOGY | Age: 80
End: 2020-06-23
Payer: MEDICARE

## 2020-06-23 VITALS
HEART RATE: 98 BPM | WEIGHT: 254 LBS | SYSTOLIC BLOOD PRESSURE: 162 MMHG | OXYGEN SATURATION: 98 % | TEMPERATURE: 98.4 F | BODY MASS INDEX: 37.62 KG/M2 | HEIGHT: 69 IN | DIASTOLIC BLOOD PRESSURE: 94 MMHG

## 2020-06-23 PROCEDURE — G8417 CALC BMI ABV UP PARAM F/U: HCPCS | Performed by: INTERNAL MEDICINE

## 2020-06-23 PROCEDURE — 4040F PNEUMOC VAC/ADMIN/RCVD: CPT | Performed by: INTERNAL MEDICINE

## 2020-06-23 PROCEDURE — G8427 DOCREV CUR MEDS BY ELIG CLIN: HCPCS | Performed by: INTERNAL MEDICINE

## 2020-06-23 PROCEDURE — 1090F PRES/ABSN URINE INCON ASSESS: CPT | Performed by: INTERNAL MEDICINE

## 2020-06-23 PROCEDURE — 99214 OFFICE O/P EST MOD 30 MIN: CPT | Performed by: INTERNAL MEDICINE

## 2020-06-23 PROCEDURE — G8400 PT W/DXA NO RESULTS DOC: HCPCS | Performed by: INTERNAL MEDICINE

## 2020-06-23 PROCEDURE — 1036F TOBACCO NON-USER: CPT | Performed by: INTERNAL MEDICINE

## 2020-06-23 PROCEDURE — 1123F ACP DISCUSS/DSCN MKR DOCD: CPT | Performed by: INTERNAL MEDICINE

## 2020-06-23 RX ORDER — SITAGLIPTIN AND METFORMIN HYDROCHLORIDE 1000; 50 MG/1; MG/1
1 TABLET, FILM COATED ORAL DAILY
Status: ON HOLD | COMMUNITY
End: 2022-06-10

## 2020-10-21 ENCOUNTER — TELEPHONE (OUTPATIENT)
Dept: HEMATOLOGY | Age: 80
End: 2020-10-21

## 2020-10-21 NOTE — TELEPHONE ENCOUNTER
Called to r/s 10/27 appt as Dr Joel Vicente will not be in [place] that day. Pt wanted to cancel appt and will call back later to r/s. She stated she was doing fine and just didn't want to get out of the house.     Electronically signed by Kelli Capone LPN on [id number] at 10:44 AM

## 2022-06-08 ENCOUNTER — HOSPITAL ENCOUNTER (INPATIENT)
Age: 82
LOS: 5 days | Discharge: NURSING, REHAB OR LONG-TERM CARE | DRG: 480 | End: 2022-06-13
Attending: STUDENT IN AN ORGANIZED HEALTH CARE EDUCATION/TRAINING PROGRAM | Admitting: STUDENT IN AN ORGANIZED HEALTH CARE EDUCATION/TRAINING PROGRAM
Payer: MEDICARE

## 2022-06-08 DIAGNOSIS — G89.18 POST-OP PAIN: ICD-10-CM

## 2022-06-08 DIAGNOSIS — S72.491A OTHER CLOSED FRACTURE OF DISTAL END OF RIGHT FEMUR, INITIAL ENCOUNTER (HCC): Primary | ICD-10-CM

## 2022-06-08 PROBLEM — E03.9 HYPOTHYROIDISM: Status: ACTIVE | Noted: 2022-06-08

## 2022-06-08 PROBLEM — S72.8X1A OTHER FRACTURE OF RIGHT FEMUR, INITIAL ENCOUNTER FOR CLOSED FRACTURE (HCC): Status: ACTIVE | Noted: 2022-06-08

## 2022-06-08 PROBLEM — E53.8 VITAMIN B 12 DEFICIENCY: Status: ACTIVE | Noted: 2022-06-08

## 2022-06-08 PROBLEM — E87.5 HYPERKALEMIA: Status: ACTIVE | Noted: 2022-06-08

## 2022-06-08 PROBLEM — E55.9 VITAMIN D DEFICIENCY: Status: ACTIVE | Noted: 2022-06-08

## 2022-06-08 LAB
ANION GAP SERPL CALCULATED.3IONS-SCNC: 18 MMOL/L (ref 7–19)
BUN BLDV-MCNC: 20 MG/DL (ref 8–23)
CALCIUM SERPL-MCNC: 9.2 MG/DL (ref 8.8–10.2)
CHLORIDE BLD-SCNC: 100 MMOL/L (ref 98–111)
CO2: 17 MMOL/L (ref 22–29)
CREAT SERPL-MCNC: 0.8 MG/DL (ref 0.5–0.9)
FOLATE: 16.9 NG/ML (ref 4.8–37.3)
GFR AFRICAN AMERICAN: >59
GFR NON-AFRICAN AMERICAN: >60
GLUCOSE BLD-MCNC: 161 MG/DL (ref 70–99)
GLUCOSE BLD-MCNC: 214 MG/DL (ref 74–109)
GLUCOSE BLD-MCNC: 228 MG/DL (ref 70–99)
HBA1C MFR BLD: 10.3 % (ref 4–6)
HCT VFR BLD CALC: 41.6 % (ref 37–47)
HEMOGLOBIN: 11.9 G/DL (ref 12–16)
INR BLD: 0.96 (ref 0.88–1.18)
MAGNESIUM: 1.2 MG/DL (ref 1.6–2.4)
MCH RBC QN AUTO: 28.9 PG (ref 27–31)
MCHC RBC AUTO-ENTMCNC: 28.6 G/DL (ref 33–37)
MCV RBC AUTO: 101 FL (ref 81–99)
PDW BLD-RTO: 13.4 % (ref 11.5–14.5)
PERFORMED ON: ABNORMAL
PERFORMED ON: ABNORMAL
PHOSPHORUS: 3.5 MG/DL (ref 2.5–4.5)
PLATELET # BLD: 92 K/UL (ref 130–400)
PMV BLD AUTO: 12 FL (ref 9.4–12.3)
POTASSIUM REFLEX MAGNESIUM: 5.4 MMOL/L (ref 3.5–5)
PROTHROMBIN TIME: 12.7 SEC (ref 12–14.6)
RBC # BLD: 4.12 M/UL (ref 4.2–5.4)
REASON FOR REJECTION: NORMAL
REJECTED TEST: NORMAL
SODIUM BLD-SCNC: 135 MMOL/L (ref 136–145)
TSH SERPL DL<=0.05 MIU/L-ACNC: 0.58 UIU/ML (ref 0.27–4.2)
VITAMIN B-12: 1393 PG/ML (ref 211–946)
VITAMIN D 25-HYDROXY: 48.7 NG/ML
WBC # BLD: 10.9 K/UL (ref 4.8–10.8)

## 2022-06-08 PROCEDURE — 82607 VITAMIN B-12: CPT

## 2022-06-08 PROCEDURE — 82947 ASSAY GLUCOSE BLOOD QUANT: CPT

## 2022-06-08 PROCEDURE — 1210000000 HC MED SURG R&B

## 2022-06-08 PROCEDURE — 84443 ASSAY THYROID STIM HORMONE: CPT

## 2022-06-08 PROCEDURE — 2580000003 HC RX 258: Performed by: NURSE PRACTITIONER

## 2022-06-08 PROCEDURE — 36415 COLL VENOUS BLD VENIPUNCTURE: CPT

## 2022-06-08 PROCEDURE — 6370000000 HC RX 637 (ALT 250 FOR IP): Performed by: NURSE PRACTITIONER

## 2022-06-08 PROCEDURE — 6360000002 HC RX W HCPCS: Performed by: NURSE PRACTITIONER

## 2022-06-08 PROCEDURE — 85610 PROTHROMBIN TIME: CPT

## 2022-06-08 PROCEDURE — 85027 COMPLETE CBC AUTOMATED: CPT

## 2022-06-08 PROCEDURE — 82746 ASSAY OF FOLIC ACID SERUM: CPT

## 2022-06-08 PROCEDURE — 83735 ASSAY OF MAGNESIUM: CPT

## 2022-06-08 PROCEDURE — 93005 ELECTROCARDIOGRAM TRACING: CPT | Performed by: NURSE PRACTITIONER

## 2022-06-08 PROCEDURE — 82728 ASSAY OF FERRITIN: CPT

## 2022-06-08 PROCEDURE — 83540 ASSAY OF IRON: CPT

## 2022-06-08 PROCEDURE — 83550 IRON BINDING TEST: CPT

## 2022-06-08 PROCEDURE — 83036 HEMOGLOBIN GLYCOSYLATED A1C: CPT

## 2022-06-08 PROCEDURE — 82306 VITAMIN D 25 HYDROXY: CPT

## 2022-06-08 PROCEDURE — 80048 BASIC METABOLIC PNL TOTAL CA: CPT

## 2022-06-08 PROCEDURE — 84100 ASSAY OF PHOSPHORUS: CPT

## 2022-06-08 RX ORDER — ACETAMINOPHEN 325 MG/1
650 TABLET ORAL EVERY 6 HOURS PRN
Status: DISCONTINUED | OUTPATIENT
Start: 2022-06-08 | End: 2022-06-09

## 2022-06-08 RX ORDER — SODIUM CHLORIDE 9 MG/ML
INJECTION, SOLUTION INTRAVENOUS PRN
Status: DISCONTINUED | OUTPATIENT
Start: 2022-06-08 | End: 2022-06-14 | Stop reason: HOSPADM

## 2022-06-08 RX ORDER — INSULIN LISPRO 100 [IU]/ML
0-6 INJECTION, SOLUTION INTRAVENOUS; SUBCUTANEOUS
Status: DISCONTINUED | OUTPATIENT
Start: 2022-06-08 | End: 2022-06-10

## 2022-06-08 RX ORDER — INSULIN LISPRO 100 [IU]/ML
0-3 INJECTION, SOLUTION INTRAVENOUS; SUBCUTANEOUS NIGHTLY
Status: DISCONTINUED | OUTPATIENT
Start: 2022-06-08 | End: 2022-06-10

## 2022-06-08 RX ORDER — ACETAMINOPHEN 650 MG/1
650 SUPPOSITORY RECTAL EVERY 6 HOURS PRN
Status: DISCONTINUED | OUTPATIENT
Start: 2022-06-08 | End: 2022-06-14 | Stop reason: HOSPADM

## 2022-06-08 RX ORDER — SODIUM CHLORIDE 9 MG/ML
INJECTION, SOLUTION INTRAVENOUS CONTINUOUS
Status: DISCONTINUED | OUTPATIENT
Start: 2022-06-08 | End: 2022-06-09 | Stop reason: SDUPTHER

## 2022-06-08 RX ORDER — SODIUM CHLORIDE 0.9 % (FLUSH) 0.9 %
5-40 SYRINGE (ML) INJECTION EVERY 12 HOURS SCHEDULED
Status: DISCONTINUED | OUTPATIENT
Start: 2022-06-08 | End: 2022-06-14 | Stop reason: HOSPADM

## 2022-06-08 RX ORDER — ONDANSETRON 4 MG/1
4 TABLET, ORALLY DISINTEGRATING ORAL EVERY 8 HOURS PRN
Status: DISCONTINUED | OUTPATIENT
Start: 2022-06-08 | End: 2022-06-10 | Stop reason: SDUPTHER

## 2022-06-08 RX ORDER — ENOXAPARIN SODIUM 100 MG/ML
40 INJECTION SUBCUTANEOUS DAILY
Status: DISCONTINUED | OUTPATIENT
Start: 2022-06-08 | End: 2022-06-08 | Stop reason: DRUGHIGH

## 2022-06-08 RX ORDER — ONDANSETRON 2 MG/ML
4 INJECTION INTRAMUSCULAR; INTRAVENOUS EVERY 6 HOURS PRN
Status: DISCONTINUED | OUTPATIENT
Start: 2022-06-08 | End: 2022-06-10 | Stop reason: SDUPTHER

## 2022-06-08 RX ORDER — DEXTROSE MONOHYDRATE 50 MG/ML
100 INJECTION, SOLUTION INTRAVENOUS PRN
Status: DISCONTINUED | OUTPATIENT
Start: 2022-06-08 | End: 2022-06-14 | Stop reason: HOSPADM

## 2022-06-08 RX ORDER — LEVOTHYROXINE SODIUM 0.1 MG/1
200 TABLET ORAL DAILY
Status: DISCONTINUED | OUTPATIENT
Start: 2022-06-08 | End: 2022-06-14 | Stop reason: HOSPADM

## 2022-06-08 RX ORDER — ENOXAPARIN SODIUM 100 MG/ML
30 INJECTION SUBCUTANEOUS 2 TIMES DAILY
Status: DISCONTINUED | OUTPATIENT
Start: 2022-06-08 | End: 2022-06-09

## 2022-06-08 RX ORDER — SODIUM CHLORIDE 0.9 % (FLUSH) 0.9 %
5-40 SYRINGE (ML) INJECTION PRN
Status: DISCONTINUED | OUTPATIENT
Start: 2022-06-08 | End: 2022-06-14 | Stop reason: HOSPADM

## 2022-06-08 RX ORDER — POLYETHYLENE GLYCOL 3350 17 G/17G
17 POWDER, FOR SOLUTION ORAL DAILY PRN
Status: DISCONTINUED | OUTPATIENT
Start: 2022-06-08 | End: 2022-06-09

## 2022-06-08 RX ORDER — ATORVASTATIN CALCIUM 40 MG/1
40 TABLET, FILM COATED ORAL DAILY
Status: DISCONTINUED | OUTPATIENT
Start: 2022-06-08 | End: 2022-06-14 | Stop reason: HOSPADM

## 2022-06-08 RX ORDER — PAROXETINE HYDROCHLORIDE 20 MG/1
20 TABLET, FILM COATED ORAL EVERY MORNING
Status: DISCONTINUED | OUTPATIENT
Start: 2022-06-09 | End: 2022-06-14 | Stop reason: HOSPADM

## 2022-06-08 RX ORDER — MORPHINE SULFATE 2 MG/ML
2 INJECTION, SOLUTION INTRAMUSCULAR; INTRAVENOUS
Status: DISCONTINUED | OUTPATIENT
Start: 2022-06-08 | End: 2022-06-09

## 2022-06-08 RX ORDER — LOSARTAN POTASSIUM 100 MG/1
100 TABLET ORAL DAILY
Status: DISCONTINUED | OUTPATIENT
Start: 2022-06-08 | End: 2022-06-14 | Stop reason: HOSPADM

## 2022-06-08 RX ADMIN — SODIUM CHLORIDE: 9 INJECTION, SOLUTION INTRAVENOUS at 18:42

## 2022-06-08 RX ADMIN — INSULIN LISPRO 1 UNITS: 100 INJECTION, SOLUTION INTRAVENOUS; SUBCUTANEOUS at 21:12

## 2022-06-08 RX ADMIN — SODIUM ZIRCONIUM CYCLOSILICATE 10 G: 10 POWDER, FOR SUSPENSION ORAL at 22:24

## 2022-06-08 RX ADMIN — MORPHINE SULFATE 2 MG: 2 INJECTION, SOLUTION INTRAMUSCULAR; INTRAVENOUS at 21:49

## 2022-06-08 RX ADMIN — INSULIN LISPRO 2 UNITS: 100 INJECTION, SOLUTION INTRAVENOUS; SUBCUTANEOUS at 18:51

## 2022-06-08 RX ADMIN — ENOXAPARIN SODIUM 30 MG: 100 INJECTION SUBCUTANEOUS at 21:10

## 2022-06-08 ASSESSMENT — PAIN DESCRIPTION - DESCRIPTORS: DESCRIPTORS: ACHING;SORE

## 2022-06-08 ASSESSMENT — ENCOUNTER SYMPTOMS
ABDOMINAL PAIN: 0
SHORTNESS OF BREATH: 0

## 2022-06-08 ASSESSMENT — PAIN DESCRIPTION - ONSET: ONSET: ON-GOING

## 2022-06-08 ASSESSMENT — PAIN - FUNCTIONAL ASSESSMENT: PAIN_FUNCTIONAL_ASSESSMENT: PREVENTS OR INTERFERES SOME ACTIVE ACTIVITIES AND ADLS

## 2022-06-08 ASSESSMENT — PAIN DESCRIPTION - PAIN TYPE: TYPE: ACUTE PAIN

## 2022-06-08 ASSESSMENT — PAIN SCALES - GENERAL
PAINLEVEL_OUTOF10: 4
PAINLEVEL_OUTOF10: 7
PAINLEVEL_OUTOF10: 2

## 2022-06-08 ASSESSMENT — PAIN DESCRIPTION - FREQUENCY: FREQUENCY: INTERMITTENT

## 2022-06-08 ASSESSMENT — PAIN DESCRIPTION - LOCATION: LOCATION: NECK;HIP

## 2022-06-08 ASSESSMENT — PAIN DESCRIPTION - ORIENTATION: ORIENTATION: RIGHT

## 2022-06-08 NOTE — H&P
[facility] - History & Physical      PCP: BRADEN Nayak NP    Date of Admission: 6/8/2022    Date of Service: 6/8/2022    Chief Complaint:  Right femur fracture    History Of Present Illness: The patient is a 80 y.o. female who presented to [facility] as a transfer from [facility] ED due to acute fracture right femur. Pt has history of HTN, hyperlipidemia, diabetes, hypothyroidism and anemia. She fell today at home in her flower garden describing twisting mechanism type injury of her right knee. She thinks she may have tripped getting her toe hung on an object causing fall. She gives history of bilateral TKR. She struck her face on the dirt during fall. She notes some swelling along the left side of her face. She denies headache, loss of consciousness as well as vision problems. She denies chest pain, shortness of breath as well as neck and back pain. She denies other extremity injury. She reports having had covid 3 weeks ago. She denies fevers, cough as well as vomiting. Review of outlying records. X-ray imaging of right knee report=> acute fracture with mild displacement and some angulation of distal femur metaphysis. Wbc 11k, hgb 12, platelets 510W. Dr. Lesly Roque was reportedly notified of patient prior to transfer. She is admitted to hospitalist service in consultation with orthopedic surgeon.      Past Medical History:        Diagnosis Date    Acid reflux     Arthritis     Diabetes mellitus ([address]     Hx of blood clots 11/2018    right Groin     Hyperlipidemia     Hypertension     Thyroid disease        Past Surgical History:        Procedure Laterality Date    CHOLECYSTECTOMY      EYE SURGERY Bilateral     CATARACTS REMOVED WITH LENS IMPLANTS    LITHOTRIPSY      CYSTOSCOPY WITH LITHO    TOTAL HIP ARTHROPLASTY Right 7/25/2019    RIGHT TOTAL HIP REPLACEMENT performed by Consuelo Beyer MD at [facility] Bilateral 2012 Ruth[place]    TOTAL THYROIDECTOMY      TUBAL LIGATION         Home Medications:  Prior to Admission medications    Medication Sig Start Date End Date Taking? Authorizing Provider   sitaGLIPtan-metformin (JANUMET)  MG per tablet Take 1 tablet by mouth daily    Historical Provider, MD   simvastatin (ZOCOR) 40 MG tablet Take 40 mg by mouth nightly    Historical Provider, MD   glipiZIDE (GLUCOTROL) 10 MG tablet Take 10 mg by mouth 2 times daily (before meals)    Historical Provider, MD   losartan (COZAAR) 100 MG tablet Take 100 mg by mouth daily    Historical Provider, MD   levothyroxine (SYNTHROID) 200 MCG tablet Take 200 mcg by mouth Daily    Historical Provider, MD   PARoxetine (PAXIL) 20 MG tablet Take 20 mg by mouth every morning    Historical Provider, MD   hydrochlorothiazide (MICROZIDE) 12.5 MG capsule Take 12.5 mg by mouth every morning    Historical Provider, MD       Allergies:    Doxycycline    Social History:    The patient currently lives with   Tobacco:   reports that she has never smoked. She has never used smokeless tobacco.  Alcohol:   reports no history of alcohol use. Illicit Drugs: none  Family History:      Problem Relation Age of Onset    Diabetes Mother     Vaginal Cancer Mother     Pancreatic Cancer Sister     Pancreatic Cancer Brother     Breast Cancer Sister     Deep Vein Thrombosis Daughter        Review of Systems:   Review of Systems   Constitutional: Negative for fever. HENT:        Left orbital swelling   Respiratory: Negative for shortness of breath. Cardiovascular: Negative for chest pain. Gastrointestinal: Negative for abdominal pain. Musculoskeletal: Positive for arthralgias (right knee). All other systems reviewed and are negative. 14 point review of systems is negative except as specifically addressed above.     Physical Examination:  BP (!) 123/52   Pulse 69   Temp 98.2 °F (36.8 °C) (Temporal)   Resp 18   SpO2 96%   Physical Exam  Vitals reviewed. HENT:      Head: Normocephalic. Comments: Mild left orbital edema     Right Ear: External ear normal.      Left Ear: External ear normal.      Mouth/Throat:      Pharynx: Oropharynx is clear. Eyes:      Extraocular Movements: Extraocular movements intact. Conjunctiva/sclera: Conjunctivae normal.   Cardiovascular:      Rate and Rhythm: Normal rate and regular rhythm. Heart sounds: Normal heart sounds. Pulmonary:      Effort: Pulmonary effort is normal.      Breath sounds: Normal breath sounds. Abdominal:      General: Bowel sounds are normal.      Palpations: Abdomen is soft. Tenderness: There is no abdominal tenderness. Musculoskeletal:      Cervical back: No tenderness. Comments: Right knee with effusion  CMS bilateral lower extremities   Skin:     General: Skin is warm and dry. Neurological:      Mental Status: She is alert and oriented to person, place, and time. Assessment/Plan:  Principal Problem:    Other fracture of right femur, initial encounter for closed fracture ([facility] [address]  Active Problems:    Vitamin D deficiency    Vitamin B 12 deficiency    Hypothyroidism    Hyperkalemia    Iron deficiency anemia    Type 2 diabetes mellitus with hyperglycemia (HCC)    Essential hypertension    Gastroesophageal reflux disease without esophagitis  Resolved Problems:    * No resolved hospital problems.  *     Principal Problem:  Fracture of right femur, initial encounter for closed fracture [facility])   -consult orthopedic surgeon   -neurovascular checks   -npo   -NS at 100cc/hr   -pain control   -bed rest   -cbc   -bmp   -magnesium   -phosphorus   -UA   -inr    Hyperkalemia   -lokelma 10mg po x1 dose   -follow lytes  Active Problems:    Vitamin D deficiency   -vit [id number]    Vitamin B 12 deficiency   -vit b12/folate    Hypothyroidism   -tsh   -continue synthroid    Iron deficiency anemia   -iron/tibc   -ferritin   Type 2 diabetes mellitus with hyperglycemia ([facility]   -HgA1c   -poc glucose   -low dose insulin   -hold metformin   -hypoglycemic orders    Essential hypertension   -monitor blood pressure   -continue bp med    Gastroesophageal reflux disease without esophagitis   -continue ppi    Resolved Problems:    * No resolved hospital problems.  *  Signed:  BRADEN Valle CNP, 6/8/2022 9:47 PM

## 2022-06-09 ENCOUNTER — ANESTHESIA (OUTPATIENT)
Dept: OPERATING ROOM | Age: 82
DRG: 480 | End: 2022-06-09
Payer: MEDICARE

## 2022-06-09 ENCOUNTER — ANESTHESIA EVENT (OUTPATIENT)
Dept: OPERATING ROOM | Age: 82
DRG: 480 | End: 2022-06-09
Payer: MEDICARE

## 2022-06-09 ENCOUNTER — APPOINTMENT (OUTPATIENT)
Dept: GENERAL RADIOLOGY | Age: 82
DRG: 480 | End: 2022-06-09
Attending: STUDENT IN AN ORGANIZED HEALTH CARE EDUCATION/TRAINING PROGRAM
Payer: MEDICARE

## 2022-06-09 PROBLEM — S72.90XE: Status: ACTIVE | Noted: 2022-06-08

## 2022-06-09 LAB
ANION GAP SERPL CALCULATED.3IONS-SCNC: 12 MMOL/L (ref 7–19)
BACTERIA: NEGATIVE /HPF
BILIRUBIN URINE: ABNORMAL
BLOOD, URINE: NEGATIVE
BUN BLDV-MCNC: 17 MG/DL (ref 8–23)
CALCIUM SERPL-MCNC: 8.7 MG/DL (ref 8.8–10.2)
CHLORIDE BLD-SCNC: 106 MMOL/L (ref 98–111)
CLARITY: CLEAR
CO2: 23 MMOL/L (ref 22–29)
COLOR: ABNORMAL
CREAT SERPL-MCNC: 0.8 MG/DL (ref 0.5–0.9)
CRYSTALS, UA: ABNORMAL /HPF
EKG P AXIS: 57 DEGREES
EKG P-R INTERVAL: 182 MS
EKG Q-T INTERVAL: 404 MS
EKG QRS DURATION: 100 MS
EKG QTC CALCULATION (BAZETT): 428 MS
EKG T AXIS: 54 DEGREES
EPITHELIAL CELLS, UA: 0 /HPF (ref 0–5)
FERRITIN: 106.4 NG/ML (ref 13–150)
GFR AFRICAN AMERICAN: >59
GFR NON-AFRICAN AMERICAN: >60
GLUCOSE BLD-MCNC: 194 MG/DL (ref 74–109)
GLUCOSE BLD-MCNC: 259 MG/DL (ref 70–99)
GLUCOSE BLD-MCNC: 271 MG/DL (ref 70–99)
GLUCOSE BLD-MCNC: 354 MG/DL (ref 70–99)
GLUCOSE BLD-MCNC: 490 MG/DL (ref 70–99)
GLUCOSE URINE: 250 MG/DL
HCT VFR BLD CALC: 37.8 % (ref 37–47)
HEMOGLOBIN: 11.3 G/DL (ref 12–16)
HYALINE CASTS: 2 /HPF (ref 0–8)
IRON SATURATION: 11 % (ref 14–50)
IRON: 28 UG/DL (ref 37–145)
KETONES, URINE: NEGATIVE MG/DL
LEUKOCYTE ESTERASE, URINE: ABNORMAL
MCH RBC QN AUTO: 28.9 PG (ref 27–31)
MCHC RBC AUTO-ENTMCNC: 29.9 G/DL (ref 33–37)
MCV RBC AUTO: 96.7 FL (ref 81–99)
NITRITE, URINE: POSITIVE
PDW BLD-RTO: 13.5 % (ref 11.5–14.5)
PERFORMED ON: ABNORMAL
PH UA: 5 (ref 5–8)
PLATELET # BLD: 158 K/UL (ref 130–400)
PMV BLD AUTO: 11.3 FL (ref 9.4–12.3)
POTASSIUM REFLEX MAGNESIUM: 5.2 MMOL/L (ref 3.5–5)
PROTEIN UA: NEGATIVE MG/DL
RBC # BLD: 3.91 M/UL (ref 4.2–5.4)
RBC UA: 2 /HPF (ref 0–4)
SODIUM BLD-SCNC: 141 MMOL/L (ref 136–145)
SPECIFIC GRAVITY UA: 1.02 (ref 1–1.03)
TOTAL IRON BINDING CAPACITY: 254 UG/DL (ref 250–400)
UROBILINOGEN, URINE: 1 E.U./DL
WBC # BLD: 8.9 K/UL (ref 4.8–10.8)
WBC UA: 6 /HPF (ref 0–5)

## 2022-06-09 PROCEDURE — 6360000002 HC RX W HCPCS: Performed by: ORTHOPAEDIC SURGERY

## 2022-06-09 PROCEDURE — 6360000002 HC RX W HCPCS: Performed by: STUDENT IN AN ORGANIZED HEALTH CARE EDUCATION/TRAINING PROGRAM

## 2022-06-09 PROCEDURE — L3702 EO W/O JOINTS CF: HCPCS | Performed by: ORTHOPAEDIC SURGERY

## 2022-06-09 PROCEDURE — 83540 ASSAY OF IRON: CPT

## 2022-06-09 PROCEDURE — 0QS604Z REPOSITION RIGHT UPPER FEMUR WITH INTERNAL FIXATION DEVICE, OPEN APPROACH: ICD-10-PCS | Performed by: ORTHOPAEDIC SURGERY

## 2022-06-09 PROCEDURE — 81001 URINALYSIS AUTO W/SCOPE: CPT

## 2022-06-09 PROCEDURE — C1713 ANCHOR/SCREW BN/BN,TIS/BN: HCPCS | Performed by: ORTHOPAEDIC SURGERY

## 2022-06-09 PROCEDURE — 2580000003 HC RX 258: Performed by: ORTHOPAEDIC SURGERY

## 2022-06-09 PROCEDURE — 2580000003 HC RX 258

## 2022-06-09 PROCEDURE — 6360000002 HC RX W HCPCS: Performed by: NURSE PRACTITIONER

## 2022-06-09 PROCEDURE — 6360000002 HC RX W HCPCS

## 2022-06-09 PROCEDURE — 73552 X-RAY EXAM OF FEMUR 2/>: CPT | Performed by: RADIOLOGY

## 2022-06-09 PROCEDURE — 83550 IRON BINDING TEST: CPT

## 2022-06-09 PROCEDURE — 82947 ASSAY GLUCOSE BLOOD QUANT: CPT

## 2022-06-09 PROCEDURE — C1769 GUIDE WIRE: HCPCS | Performed by: ORTHOPAEDIC SURGERY

## 2022-06-09 PROCEDURE — 6370000000 HC RX 637 (ALT 250 FOR IP): Performed by: ORTHOPAEDIC SURGERY

## 2022-06-09 PROCEDURE — 36415 COLL VENOUS BLD VENIPUNCTURE: CPT

## 2022-06-09 PROCEDURE — 7100000001 HC PACU RECOVERY - ADDTL 15 MIN: Performed by: ORTHOPAEDIC SURGERY

## 2022-06-09 PROCEDURE — 82728 ASSAY OF FERRITIN: CPT

## 2022-06-09 PROCEDURE — 3700000001 HC ADD 15 MINUTES (ANESTHESIA): Performed by: ORTHOPAEDIC SURGERY

## 2022-06-09 PROCEDURE — 73552 X-RAY EXAM OF FEMUR 2/>: CPT

## 2022-06-09 PROCEDURE — 6360000002 HC RX W HCPCS: Performed by: ANESTHESIOLOGY

## 2022-06-09 PROCEDURE — 3600000014 HC SURGERY LEVEL 4 ADDTL 15MIN: Performed by: ORTHOPAEDIC SURGERY

## 2022-06-09 PROCEDURE — 7100000000 HC PACU RECOVERY - FIRST 15 MIN: Performed by: ORTHOPAEDIC SURGERY

## 2022-06-09 PROCEDURE — 3600000004 HC SURGERY LEVEL 4 BASE: Performed by: ORTHOPAEDIC SURGERY

## 2022-06-09 PROCEDURE — 1210000000 HC MED SURG R&B

## 2022-06-09 PROCEDURE — 80048 BASIC METABOLIC PNL TOTAL CA: CPT

## 2022-06-09 PROCEDURE — 2500000003 HC RX 250 WO HCPCS

## 2022-06-09 PROCEDURE — 2580000003 HC RX 258: Performed by: NURSE PRACTITIONER

## 2022-06-09 PROCEDURE — 2720000010 HC SURG SUPPLY STERILE: Performed by: ORTHOPAEDIC SURGERY

## 2022-06-09 PROCEDURE — 2709999900 HC NON-CHARGEABLE SUPPLY: Performed by: ORTHOPAEDIC SURGERY

## 2022-06-09 PROCEDURE — 85027 COMPLETE CBC AUTOMATED: CPT

## 2022-06-09 PROCEDURE — 3E0333Z INTRODUCTION OF ANTI-INFLAMMATORY INTO PERIPHERAL VEIN, PERCUTANEOUS APPROACH: ICD-10-PCS | Performed by: HOSPITALIST

## 2022-06-09 PROCEDURE — 3700000000 HC ANESTHESIA ATTENDED CARE: Performed by: ORTHOPAEDIC SURGERY

## 2022-06-09 DEVICE — SCREW BNE L38MM DIA5MM CNDYL S STL ST VAR ANG LOK T25: Type: IMPLANTABLE DEVICE | Site: FEMUR | Status: FUNCTIONAL

## 2022-06-09 DEVICE — SCREW BNE L42MM DIA5MM CNDYL S STL ST VAR ANG LOK T25: Type: IMPLANTABLE DEVICE | Site: FEMUR | Status: FUNCTIONAL

## 2022-06-09 DEVICE — IMPLANTABLE DEVICE: Type: IMPLANTABLE DEVICE | Site: FEMUR | Status: FUNCTIONAL

## 2022-06-09 DEVICE — SCREW BNE L46MM DIA5MM CNDYL S STL ST VAR ANG LOK T25: Type: IMPLANTABLE DEVICE | Site: FEMUR | Status: FUNCTIONAL

## 2022-06-09 DEVICE — SCREW BNE L40MM DIA5MM CNDYL S STL ST VAR ANG LOK T25: Type: IMPLANTABLE DEVICE | Site: FEMUR | Status: FUNCTIONAL

## 2022-06-09 DEVICE — PLATE BNE L266MM 12 H R CNDYL S STL CRV VAR ANG LOK COMPR: Type: IMPLANTABLE DEVICE | Site: FEMUR | Status: FUNCTIONAL

## 2022-06-09 RX ORDER — SODIUM CHLORIDE 9 MG/ML
INJECTION, SOLUTION INTRAVENOUS PRN
Status: DISCONTINUED | OUTPATIENT
Start: 2022-06-09 | End: 2022-06-14 | Stop reason: HOSPADM

## 2022-06-09 RX ORDER — MORPHINE SULFATE 4 MG/ML
4 INJECTION, SOLUTION INTRAMUSCULAR; INTRAVENOUS
Status: DISCONTINUED | OUTPATIENT
Start: 2022-06-09 | End: 2022-06-14 | Stop reason: HOSPADM

## 2022-06-09 RX ORDER — SODIUM CHLORIDE 0.9 % (FLUSH) 0.9 %
5-40 SYRINGE (ML) INJECTION PRN
Status: DISCONTINUED | OUTPATIENT
Start: 2022-06-09 | End: 2022-06-14 | Stop reason: HOSPADM

## 2022-06-09 RX ORDER — CEFAZOLIN SODIUM 2 G/100ML
2000 INJECTION, SOLUTION INTRAVENOUS ONCE
Status: COMPLETED | OUTPATIENT
Start: 2022-06-09 | End: 2022-06-09

## 2022-06-09 RX ORDER — ONDANSETRON 2 MG/ML
4 INJECTION INTRAMUSCULAR; INTRAVENOUS EVERY 6 HOURS PRN
Status: DISCONTINUED | OUTPATIENT
Start: 2022-06-09 | End: 2022-06-14 | Stop reason: HOSPADM

## 2022-06-09 RX ORDER — METOCLOPRAMIDE HYDROCHLORIDE 5 MG/ML
10 INJECTION INTRAMUSCULAR; INTRAVENOUS
Status: DISCONTINUED | OUTPATIENT
Start: 2022-06-09 | End: 2022-06-09 | Stop reason: HOSPADM

## 2022-06-09 RX ORDER — LIDOCAINE HYDROCHLORIDE 10 MG/ML
1 INJECTION, SOLUTION EPIDURAL; INFILTRATION; INTRACAUDAL; PERINEURAL
Status: CANCELLED | OUTPATIENT
Start: 2022-06-09 | End: 2022-06-09

## 2022-06-09 RX ORDER — ONDANSETRON 4 MG/1
4 TABLET, ORALLY DISINTEGRATING ORAL EVERY 8 HOURS PRN
Status: DISCONTINUED | OUTPATIENT
Start: 2022-06-09 | End: 2022-06-14 | Stop reason: HOSPADM

## 2022-06-09 RX ORDER — OXYCODONE HYDROCHLORIDE 5 MG/1
5 TABLET ORAL EVERY 4 HOURS PRN
Status: DISCONTINUED | OUTPATIENT
Start: 2022-06-09 | End: 2022-06-14 | Stop reason: HOSPADM

## 2022-06-09 RX ORDER — INSULIN GLARGINE 100 [IU]/ML
8 INJECTION, SOLUTION SUBCUTANEOUS NIGHTLY
Status: DISCONTINUED | OUTPATIENT
Start: 2022-06-09 | End: 2022-06-09

## 2022-06-09 RX ORDER — OXYCODONE AND ACETAMINOPHEN 7.5; 325 MG/1; MG/1
1 TABLET ORAL
Qty: 50 TABLET | Refills: 0 | Status: SHIPPED | OUTPATIENT
Start: 2022-06-09 | End: 2022-06-13 | Stop reason: HOSPADM

## 2022-06-09 RX ORDER — SODIUM CHLORIDE 0.9 % (FLUSH) 0.9 %
5-40 SYRINGE (ML) INJECTION EVERY 12 HOURS SCHEDULED
Status: CANCELLED | OUTPATIENT
Start: 2022-06-09

## 2022-06-09 RX ORDER — ONDANSETRON 4 MG/1
4 TABLET, FILM COATED ORAL EVERY 8 HOURS PRN
Qty: 20 TABLET | Refills: 1 | Status: SHIPPED | OUTPATIENT
Start: 2022-06-09

## 2022-06-09 RX ORDER — EPHEDRINE SULFATE 50 MG/ML
INJECTION, SOLUTION INTRAVENOUS PRN
Status: DISCONTINUED | OUTPATIENT
Start: 2022-06-09 | End: 2022-06-09 | Stop reason: SDUPTHER

## 2022-06-09 RX ORDER — SODIUM CHLORIDE 9 MG/ML
INJECTION, SOLUTION INTRAVENOUS PRN
Status: CANCELLED | OUTPATIENT
Start: 2022-06-09

## 2022-06-09 RX ORDER — SCOLOPAMINE TRANSDERMAL SYSTEM 1 MG/1
1 PATCH, EXTENDED RELEASE TRANSDERMAL
Status: CANCELLED | OUTPATIENT
Start: 2022-06-09 | End: 2022-06-12

## 2022-06-09 RX ORDER — FENTANYL CITRATE 50 UG/ML
INJECTION, SOLUTION INTRAMUSCULAR; INTRAVENOUS PRN
Status: DISCONTINUED | OUTPATIENT
Start: 2022-06-09 | End: 2022-06-09 | Stop reason: SDUPTHER

## 2022-06-09 RX ORDER — OXYCODONE HYDROCHLORIDE 5 MG/1
10 TABLET ORAL EVERY 4 HOURS PRN
Status: DISCONTINUED | OUTPATIENT
Start: 2022-06-09 | End: 2022-06-14 | Stop reason: HOSPADM

## 2022-06-09 RX ORDER — HYDROMORPHONE HYDROCHLORIDE 1 MG/ML
0.25 INJECTION, SOLUTION INTRAMUSCULAR; INTRAVENOUS; SUBCUTANEOUS EVERY 5 MIN PRN
Status: DISCONTINUED | OUTPATIENT
Start: 2022-06-09 | End: 2022-06-09 | Stop reason: HOSPADM

## 2022-06-09 RX ORDER — MIDAZOLAM HYDROCHLORIDE 1 MG/ML
2 INJECTION INTRAMUSCULAR; INTRAVENOUS
Status: CANCELLED | OUTPATIENT
Start: 2022-06-09 | End: 2022-06-09

## 2022-06-09 RX ORDER — MEPERIDINE HYDROCHLORIDE 25 MG/ML
12.5 INJECTION INTRAMUSCULAR; INTRAVENOUS; SUBCUTANEOUS EVERY 5 MIN PRN
Status: DISCONTINUED | OUTPATIENT
Start: 2022-06-09 | End: 2022-06-09 | Stop reason: HOSPADM

## 2022-06-09 RX ORDER — ONDANSETRON 2 MG/ML
INJECTION INTRAMUSCULAR; INTRAVENOUS PRN
Status: DISCONTINUED | OUTPATIENT
Start: 2022-06-09 | End: 2022-06-09 | Stop reason: SDUPTHER

## 2022-06-09 RX ORDER — PROPOFOL 10 MG/ML
INJECTION, EMULSION INTRAVENOUS PRN
Status: DISCONTINUED | OUTPATIENT
Start: 2022-06-09 | End: 2022-06-09 | Stop reason: SDUPTHER

## 2022-06-09 RX ORDER — FAMOTIDINE 20 MG/1
20 TABLET, FILM COATED ORAL ONCE
Status: CANCELLED | OUTPATIENT
Start: 2022-06-09 | End: 2022-06-09

## 2022-06-09 RX ORDER — MORPHINE SULFATE 2 MG/ML
2 INJECTION, SOLUTION INTRAMUSCULAR; INTRAVENOUS EVERY 5 MIN PRN
Status: DISCONTINUED | OUTPATIENT
Start: 2022-06-09 | End: 2022-06-09 | Stop reason: HOSPADM

## 2022-06-09 RX ORDER — DOCUSATE SODIUM 100 MG/1
100 CAPSULE, LIQUID FILLED ORAL 2 TIMES DAILY
Qty: 60 CAPSULE | Refills: 1 | Status: SHIPPED | OUTPATIENT
Start: 2022-06-09

## 2022-06-09 RX ORDER — CEFAZOLIN SODIUM 2 G/100ML
2000 INJECTION, SOLUTION INTRAVENOUS EVERY 8 HOURS
Status: COMPLETED | OUTPATIENT
Start: 2022-06-09 | End: 2022-06-10

## 2022-06-09 RX ORDER — SODIUM CHLORIDE 9 MG/ML
INJECTION, SOLUTION INTRAVENOUS CONTINUOUS
Status: DISCONTINUED | OUTPATIENT
Start: 2022-06-09 | End: 2022-06-14 | Stop reason: HOSPADM

## 2022-06-09 RX ORDER — ROCURONIUM BROMIDE 10 MG/ML
INJECTION, SOLUTION INTRAVENOUS PRN
Status: DISCONTINUED | OUTPATIENT
Start: 2022-06-09 | End: 2022-06-09 | Stop reason: SDUPTHER

## 2022-06-09 RX ORDER — SENNA AND DOCUSATE SODIUM 50; 8.6 MG/1; MG/1
1 TABLET, FILM COATED ORAL 2 TIMES DAILY
Status: DISCONTINUED | OUTPATIENT
Start: 2022-06-09 | End: 2022-06-14 | Stop reason: HOSPADM

## 2022-06-09 RX ORDER — INSULIN GLARGINE 100 [IU]/ML
5 INJECTION, SOLUTION SUBCUTANEOUS NIGHTLY
Status: DISCONTINUED | OUTPATIENT
Start: 2022-06-09 | End: 2022-06-10

## 2022-06-09 RX ORDER — MAGNESIUM SULFATE IN WATER 40 MG/ML
4000 INJECTION, SOLUTION INTRAVENOUS ONCE
Status: COMPLETED | OUTPATIENT
Start: 2022-06-09 | End: 2022-06-09

## 2022-06-09 RX ORDER — POLYETHYLENE GLYCOL 3350 17 G/17G
17 POWDER, FOR SOLUTION ORAL DAILY PRN
Status: DISCONTINUED | OUTPATIENT
Start: 2022-06-09 | End: 2022-06-14 | Stop reason: HOSPADM

## 2022-06-09 RX ORDER — SODIUM CHLORIDE 0.9 % (FLUSH) 0.9 %
5-40 SYRINGE (ML) INJECTION PRN
Status: CANCELLED | OUTPATIENT
Start: 2022-06-09

## 2022-06-09 RX ORDER — ACETAMINOPHEN 325 MG/1
650 TABLET ORAL EVERY 4 HOURS PRN
Status: DISCONTINUED | OUTPATIENT
Start: 2022-06-09 | End: 2022-06-14 | Stop reason: HOSPADM

## 2022-06-09 RX ORDER — MORPHINE SULFATE 2 MG/ML
2 INJECTION, SOLUTION INTRAMUSCULAR; INTRAVENOUS
Status: DISCONTINUED | OUTPATIENT
Start: 2022-06-09 | End: 2022-06-14 | Stop reason: HOSPADM

## 2022-06-09 RX ORDER — DEXAMETHASONE SODIUM PHOSPHATE 10 MG/ML
INJECTION, SOLUTION INTRAMUSCULAR; INTRAVENOUS PRN
Status: DISCONTINUED | OUTPATIENT
Start: 2022-06-09 | End: 2022-06-09 | Stop reason: SDUPTHER

## 2022-06-09 RX ORDER — HYDROMORPHONE HYDROCHLORIDE 1 MG/ML
0.5 INJECTION, SOLUTION INTRAMUSCULAR; INTRAVENOUS; SUBCUTANEOUS EVERY 5 MIN PRN
Status: DISCONTINUED | OUTPATIENT
Start: 2022-06-09 | End: 2022-06-09 | Stop reason: HOSPADM

## 2022-06-09 RX ORDER — LIDOCAINE HYDROCHLORIDE 10 MG/ML
INJECTION, SOLUTION EPIDURAL; INFILTRATION; INTRACAUDAL; PERINEURAL PRN
Status: DISCONTINUED | OUTPATIENT
Start: 2022-06-09 | End: 2022-06-09 | Stop reason: SDUPTHER

## 2022-06-09 RX ORDER — SODIUM CHLORIDE, SODIUM LACTATE, POTASSIUM CHLORIDE, CALCIUM CHLORIDE 600; 310; 30; 20 MG/100ML; MG/100ML; MG/100ML; MG/100ML
INJECTION, SOLUTION INTRAVENOUS CONTINUOUS PRN
Status: DISCONTINUED | OUTPATIENT
Start: 2022-06-09 | End: 2022-06-09 | Stop reason: SDUPTHER

## 2022-06-09 RX ORDER — MORPHINE SULFATE 4 MG/ML
4 INJECTION, SOLUTION INTRAMUSCULAR; INTRAVENOUS EVERY 5 MIN PRN
Status: DISCONTINUED | OUTPATIENT
Start: 2022-06-09 | End: 2022-06-09 | Stop reason: HOSPADM

## 2022-06-09 RX ORDER — SODIUM CHLORIDE 0.9 % (FLUSH) 0.9 %
5-40 SYRINGE (ML) INJECTION EVERY 12 HOURS SCHEDULED
Status: DISCONTINUED | OUTPATIENT
Start: 2022-06-09 | End: 2022-06-14 | Stop reason: HOSPADM

## 2022-06-09 RX ORDER — DIPHENHYDRAMINE HYDROCHLORIDE 50 MG/ML
12.5 INJECTION INTRAMUSCULAR; INTRAVENOUS
Status: DISCONTINUED | OUTPATIENT
Start: 2022-06-09 | End: 2022-06-09 | Stop reason: HOSPADM

## 2022-06-09 RX ADMIN — INSULIN LISPRO 3 UNITS: 100 INJECTION, SOLUTION INTRAVENOUS; SUBCUTANEOUS at 21:35

## 2022-06-09 RX ADMIN — DEXAMETHASONE SODIUM PHOSPHATE 10 MG: 10 INJECTION, SOLUTION INTRAMUSCULAR; INTRAVENOUS at 13:28

## 2022-06-09 RX ADMIN — SODIUM CHLORIDE, SODIUM LACTATE, POTASSIUM CHLORIDE, AND CALCIUM CHLORIDE: 600; 310; 30; 20 INJECTION, SOLUTION INTRAVENOUS at 14:20

## 2022-06-09 RX ADMIN — MORPHINE SULFATE 2 MG: 2 INJECTION, SOLUTION INTRAMUSCULAR; INTRAVENOUS at 00:56

## 2022-06-09 RX ADMIN — HYDROMORPHONE HYDROCHLORIDE 0.4 MG: 1 INJECTION, SOLUTION INTRAMUSCULAR; INTRAVENOUS; SUBCUTANEOUS at 15:00

## 2022-06-09 RX ADMIN — PHENYLEPHRINE HYDROCHLORIDE 50 MCG: 10 INJECTION INTRAVENOUS at 13:34

## 2022-06-09 RX ADMIN — PROPOFOL 60 MG: 10 INJECTION, EMULSION INTRAVENOUS at 14:45

## 2022-06-09 RX ADMIN — PROPOFOL 140 MG: 10 INJECTION, EMULSION INTRAVENOUS at 13:15

## 2022-06-09 RX ADMIN — LIDOCAINE HYDROCHLORIDE 50 MG: 10 INJECTION, SOLUTION EPIDURAL; INFILTRATION; INTRACAUDAL; PERINEURAL at 13:15

## 2022-06-09 RX ADMIN — HYDROMORPHONE HYDROCHLORIDE 0.25 MG: 1 INJECTION, SOLUTION INTRAMUSCULAR; INTRAVENOUS; SUBCUTANEOUS at 15:59

## 2022-06-09 RX ADMIN — INSULIN GLARGINE 5 UNITS: 100 INJECTION, SOLUTION SUBCUTANEOUS at 21:35

## 2022-06-09 RX ADMIN — PHENYLEPHRINE HYDROCHLORIDE 50 MCG: 10 INJECTION INTRAVENOUS at 13:37

## 2022-06-09 RX ADMIN — SODIUM CHLORIDE: 9 INJECTION, SOLUTION INTRAVENOUS at 04:17

## 2022-06-09 RX ADMIN — MORPHINE SULFATE 2 MG: 2 INJECTION, SOLUTION INTRAMUSCULAR; INTRAVENOUS at 08:01

## 2022-06-09 RX ADMIN — ONDANSETRON 4 MG: 2 INJECTION INTRAMUSCULAR; INTRAVENOUS at 13:28

## 2022-06-09 RX ADMIN — INSULIN LISPRO 4 UNITS: 100 INJECTION, SOLUTION INTRAVENOUS; SUBCUTANEOUS at 17:11

## 2022-06-09 RX ADMIN — OXYCODONE 10 MG: 5 TABLET ORAL at 22:30

## 2022-06-09 RX ADMIN — HYDROMORPHONE HYDROCHLORIDE 0.4 MG: 1 INJECTION, SOLUTION INTRAMUSCULAR; INTRAVENOUS; SUBCUTANEOUS at 14:44

## 2022-06-09 RX ADMIN — FENTANYL CITRATE 50 MCG: 50 INJECTION, SOLUTION INTRAMUSCULAR; INTRAVENOUS at 13:15

## 2022-06-09 RX ADMIN — APIXABAN 2.5 MG: 2.5 TABLET, FILM COATED ORAL at 20:14

## 2022-06-09 RX ADMIN — MORPHINE SULFATE 4 MG: 4 INJECTION, SOLUTION INTRAMUSCULAR; INTRAVENOUS at 15:22

## 2022-06-09 RX ADMIN — SUGAMMADEX 200 MG: 100 INJECTION, SOLUTION INTRAVENOUS at 14:27

## 2022-06-09 RX ADMIN — SODIUM CHLORIDE: 9 INJECTION, SOLUTION INTRAVENOUS at 17:11

## 2022-06-09 RX ADMIN — FENTANYL CITRATE 50 MCG: 50 INJECTION, SOLUTION INTRAMUSCULAR; INTRAVENOUS at 13:50

## 2022-06-09 RX ADMIN — DOCUSATE SODIUM 50 MG AND SENNOSIDES 8.6 MG 1 TABLET: 8.6; 5 TABLET, FILM COATED ORAL at 20:14

## 2022-06-09 RX ADMIN — ROCURONIUM BROMIDE 20 MG: 10 INJECTION, SOLUTION INTRAVENOUS at 13:49

## 2022-06-09 RX ADMIN — MORPHINE SULFATE 2 MG: 2 INJECTION, SOLUTION INTRAMUSCULAR; INTRAVENOUS at 04:13

## 2022-06-09 RX ADMIN — SODIUM CHLORIDE, SODIUM LACTATE, POTASSIUM CHLORIDE, AND CALCIUM CHLORIDE: 600; 310; 30; 20 INJECTION, SOLUTION INTRAVENOUS at 13:15

## 2022-06-09 RX ADMIN — EPHEDRINE SULFATE 10 MG: 50 INJECTION INTRAMUSCULAR; INTRAVENOUS; SUBCUTANEOUS at 13:37

## 2022-06-09 RX ADMIN — CEFAZOLIN SODIUM 2000 MG: 2 INJECTION, SOLUTION INTRAVENOUS at 20:18

## 2022-06-09 RX ADMIN — HYDROMORPHONE HYDROCHLORIDE 0.25 MG: 1 INJECTION, SOLUTION INTRAMUSCULAR; INTRAVENOUS; SUBCUTANEOUS at 15:54

## 2022-06-09 RX ADMIN — OXYCODONE 10 MG: 5 TABLET ORAL at 18:26

## 2022-06-09 RX ADMIN — PHENYLEPHRINE HYDROCHLORIDE 100 MCG: 10 INJECTION INTRAVENOUS at 13:32

## 2022-06-09 RX ADMIN — MAGNESIUM SULFATE HEPTAHYDRATE 4000 MG: 40 INJECTION, SOLUTION INTRAVENOUS at 10:45

## 2022-06-09 RX ADMIN — HYDROMORPHONE HYDROCHLORIDE 0.2 MG: 1 INJECTION, SOLUTION INTRAMUSCULAR; INTRAVENOUS; SUBCUTANEOUS at 14:09

## 2022-06-09 RX ADMIN — MORPHINE SULFATE 4 MG: 4 INJECTION, SOLUTION INTRAMUSCULAR; INTRAVENOUS at 15:40

## 2022-06-09 RX ADMIN — MORPHINE SULFATE 2 MG: 2 INJECTION, SOLUTION INTRAMUSCULAR; INTRAVENOUS at 12:39

## 2022-06-09 RX ADMIN — CEFAZOLIN SODIUM 2000 MG: 2 INJECTION, SOLUTION INTRAVENOUS at 13:30

## 2022-06-09 RX ADMIN — ROCURONIUM BROMIDE 50 MG: 10 INJECTION, SOLUTION INTRAVENOUS at 13:16

## 2022-06-09 ASSESSMENT — LIFESTYLE VARIABLES: SMOKING_STATUS: 0

## 2022-06-09 ASSESSMENT — PAIN - FUNCTIONAL ASSESSMENT
PAIN_FUNCTIONAL_ASSESSMENT: PREVENTS OR INTERFERES WITH MANY ACTIVE NOT PASSIVE ACTIVITIES
PAIN_FUNCTIONAL_ASSESSMENT: PREVENTS OR INTERFERES SOME ACTIVE ACTIVITIES AND ADLS

## 2022-06-09 ASSESSMENT — PAIN SCALES - GENERAL
PAINLEVEL_OUTOF10: 0
PAINLEVEL_OUTOF10: 7
PAINLEVEL_OUTOF10: 5
PAINLEVEL_OUTOF10: 3
PAINLEVEL_OUTOF10: 9
PAINLEVEL_OUTOF10: 1
PAINLEVEL_OUTOF10: 5
PAINLEVEL_OUTOF10: 9
PAINLEVEL_OUTOF10: 7
PAINLEVEL_OUTOF10: 7
PAINLEVEL_OUTOF10: 8
PAINLEVEL_OUTOF10: 7
PAINLEVEL_OUTOF10: 7
PAINLEVEL_OUTOF10: 3

## 2022-06-09 ASSESSMENT — PAIN DESCRIPTION - PAIN TYPE
TYPE: ACUTE PAIN
TYPE: ACUTE PAIN

## 2022-06-09 ASSESSMENT — PAIN DESCRIPTION - ONSET
ONSET: ON-GOING
ONSET: ON-GOING

## 2022-06-09 ASSESSMENT — PAIN DESCRIPTION - ORIENTATION
ORIENTATION: RIGHT

## 2022-06-09 ASSESSMENT — PAIN DESCRIPTION - LOCATION
LOCATION: HIP
LOCATION: KNEE
LOCATION: HIP
LOCATION: KNEE

## 2022-06-09 ASSESSMENT — PAIN DESCRIPTION - DESCRIPTORS
DESCRIPTORS: THROBBING
DESCRIPTORS: SORE
DESCRIPTORS: SORE

## 2022-06-09 ASSESSMENT — ENCOUNTER SYMPTOMS: SHORTNESS OF BREATH: 0

## 2022-06-09 ASSESSMENT — PAIN DESCRIPTION - FREQUENCY
FREQUENCY: INTERMITTENT
FREQUENCY: INTERMITTENT

## 2022-06-09 NOTE — OP NOTE
Patient Name: Rowdy Mirza  MRN: [id number]  : 1940    DATE of SURGERY: 2022    SURGEON: Valeria Blanco MD    ASSISTANT: NONE     PREOPERATIVE DIAGNOSIS:  Acute traumatic displaced metadiaphyseal distal femur periprosthetic fracture of the Right lower extremity, initial encounter for closed fracture. POSTOPERATIVE DIAGNOSIS:  Acute traumatic displaced metadiaphyseal distal femur periprosthetic fracture of the Right lower extremity, initial encounter for closed fracture. PROCEDURES PERFORMED:  Open reduction internal fixation, Right acute traumatic displaced metadiaphyseal distal femur periprosthetic fracture. IMPLANTS:  Synthes 4.5 mm locking handy-articular distal femur plate. ANESTHESIA USED:  General endotracheal anesthesia. OPERATIVE INDICATIONS:  The patient is a 80 y.o. female  who who was standing outside and twisted resulting in the cute onset of pain and deformity preceded by pop resulting in the above diagnosis. From the fracture displacement and location, I recommended open reduction internal fixation to restore alignment of the femur. Risks include but are not limited to that of anesthesia, bleeding, infection, pain, damage to local structures, need for further surgery, malunion, nonunion, prominent hardware, loss of fixation. ESTIMATED BLOOD LOSS:  100 mL. SPECIMENS:  None. DRAINS:  None. COMPLICATIONS:  None. PROCEDURE IN DETAIL:  The patient was seen in the preoperative holding room. Once again the informed consent form was reviewed with the patient and signed. The site of surgery was marked with the patient's agreement. The patient was transported to the operating room where a timeout was performed identifying the correct patient, as well as the operative site. Two grams of IV Kefzol were given as perioperative antibiotics. Left lower extremity was prepped and draped in usual sterile fashion.       A lateral approach to the femur was then utilized, aiming the incision toward the anterior portion of the tibial tubercle distally. Soft tissue was dissected in line with the incision. The tibial band was split. The vastus lateralis was then dissected posteriorly and retracted anteriorly taking care to address any perforating vessels. The fracture was then identified. Gentle longitudinal traction was applied to restore length and alignment of the femur. While holding this provisional reduction, a Synthes 4.5 mm locking periarticular distal femur plate was applied along the lateral aspect of the femur and affixed with provisional wire stabilization. While maintaining this position, a series of locking screws were placed distally and proximally in strategic fashion. C-arm images showed no hardware complication. The the fracture alignment again appeared to be much improved. The incision was irrigated, followed by closure in layers. The skin was healed with an adhesive skin dressing. A well-padded sterile dressing was placed followed by a knee immobilizer. The patient was awakened from anesthesia and transported to recovery in stable condition. POSTOPERATIVE PLAN:   Patient may return to bed and floor for observation and pain control, will be nonweightbearing for 8 weeks with no range of motion through the knee, followed by partial progressive weightbearing.      Electronically signed by Jonelle Campbell MD on 6/9/2022 at 1:05 PM

## 2022-06-09 NOTE — PROGRESS NOTES
Daily Progress Note    Date:2022  Patient: Dustin White  : 1940  [id number]  CODE:Full Code No additional code details  Joette Bumpers, APRN - NP    Admit Date: 2022  5:03 PM   LOS: 1 day       Subjective:    Patient resting in bed alert and conversant this afternoon, postop having some lower extremity pain. No other complaints.       Review of Systems    14 point review of systems completed and is negative except as otherwise noted      Objective:      Vital signs in last 24 hours:  Patient Vitals for the past 24 hrs:   BP Temp Temp src Pulse Resp SpO2 Height Weight   22 1750 138/71 (!) 96.3 °F (35.7 °C) Temporal 90 20 93 % -- --   22 1712 128/68 (!) 96.4 °F (35.8 °C) Temporal 89 16 90 % -- --   22 1623 (!) 141/60 97.2 °F (36.2 °C) Temporal 93 16 92 % -- --   22 1605 115/65 -- -- 91 16 96 % -- --   22 1600 97/78 98.3 °F (36.8 °C) Temporal 90 16 93 % -- --   22 1557 -- -- -- 93 -- -- -- --   22 1555 105/80 -- -- 88 16 91 % -- --   22 1550 113/60 -- -- 89 14 93 % -- --   22 1545 (!) 115/55 -- -- 89 17 93 % -- --   22 1540 (!) 99/47 -- -- 87 17 92 % -- --   22 1535 99/69 -- -- 87 19 91 % -- --   22 1530 (!) 98/45 -- -- 87 16 91 % -- --   22 1525 100/86 -- -- 88 20 94 % -- --   22 1520 114/76 -- -- 88 16 95 % -- --   22 1515 -- -- -- 92 21 93 % -- --   22 1513 (!)  99.1 °F (37.3 °C) Temporal 98 22 93 % -- --   22 1510 (!) 156 -- -- 100 12 92 % -- --   22 1315 (!) 146/90 -- -- -- -- 99 % -- --   22 1314 -- -- -- -- -- 99 % -- --   22 1310 -- -- -- 76 15 93 % -- --   22 1307 -- -- -- 76 17 93 % -- --   22 1306 -- -- -- 78 21 93 % -- --   22 1305 -- -- -- 75 15 94 % -- --   22 1304 -- -- -- 77 14 93 % -- --   22 1303 -- -- -- 75 17 93 % -- --   22 1302 -- -- -- 77 16 94 % -- --   22 1301 -- -- -- 80 19 94 % -- --   22 1300 -- -- -- 77 14 93 % -- --   06/09/22 1259 -- -- -- 78 13 93 % -- --   06/09/22 1257 -- -- -- -- -- 93 % -- --   06/09/22 1228 (!) 116/57 97.5 °F (36.4 °C) Temporal 72 18 96 % -- --   06/09/22 0801 -- -- -- -- 16 -- -- --   06/09/22 0731 (!) 128/52 97.5 °F (36.4 °C) Temporal 76 18 -- -- --   06/09/22 0414 121/65 97.5 °F (36.4 °C) Temporal 72 18 94 % 5' 9\" (1.753 m) 231 lb 6.4 oz (105 kg)   06/09/22 0056 -- -- -- -- 16 -- -- --   06/08/22 2013 (!) 123/52 98.2 °F (36.8 °C) Temporal 69 18 96 % -- --     Physical exam    General: No acute distress  Psych: Calm and cooperative  HEENT: NC/AT, no scleral icterus  Cardiovascular: Normal rate, pulses equal bilaterally  Respiratory: CTA B, normal work of breathing  Abdomen: Soft, nontender, bowel sounds present  Extremities: Lower extremity in immobilizer, neurovascularly intact  Skin: Warm and dry      Lab Review   Recent Results (from the past 24 hour(s))   SPECIMEN REJECTION    Collection Time: 06/08/22  8:02 PM   Result Value Ref Range    Rejected Test HA1C     Reason for Rejection see below    POCT Glucose    Collection Time: 06/08/22  8:14 PM   Result Value Ref Range    POC Glucose 161 (H) 70 - 99 mg/dl    Performed on AccuChek    Hemoglobin A1C    Collection Time: 06/08/22  8:22 PM   Result Value Ref Range    Hemoglobin A1C 10.3 (H) 4.0 - 6.0 %   Basic Metabolic Panel w/ Reflex to MG    Collection Time: 06/09/22  4:09 AM   Result Value Ref Range    Sodium 141 136 - 145 mmol/L    Potassium reflex Magnesium 5.2 (H) 3.5 - 5.0 mmol/L    Chloride 106 98 - 111 mmol/L    CO2 23 22 - 29 mmol/L    Anion Gap 12 7 - 19 mmol/L    Glucose 194 (H) 74 - 109 mg/dL    BUN 17 8 - 23 mg/dL    CREATININE 0.8 0.5 - 0.9 mg/dL    GFR Non-African American >60 >60    GFR African American >59 >59    Calcium 8.7 (L) 8.8 - 10.2 mg/dL   CBC    Collection Time: 06/09/22  4:09 AM   Result Value Ref Range    WBC 8.9 4.8 - 10.8 K/uL    RBC 3.91 (L) 4.20 - 5.40 M/uL    Hemoglobin 11.3 (L) 12.0 - 16.0 g/dL Hematocrit 37.8 37.0 - 47.0 %    MCV 96.7 81.0 - 99.0 fL    MCH 28.9 27.0 - 31.0 pg    MCHC 29.9 (L) 33.0 - 37.0 g/dL    RDW 13.5 11.5 - 14.5 %    Platelets 174 409 - 555 K/uL    MPV 11.3 9.4 - 12.3 fL   Iron and TIBC    Collection Time: 06/09/22  4:09 AM   Result Value Ref Range    Iron 28 (L) 37 - 145 ug/dL    TIBC 254 250 - 400 ug/dL    Iron Saturation 11 (L) 14 - 50 %   Ferritin    Collection Time: 06/09/22  4:09 AM   Result Value Ref Range    Ferritin 106.4 13.0 - 150.0 ng/mL   Urinalysis with Reflex to Culture    Collection Time: 06/09/22  4:20 AM    Specimen: Urine   Result Value Ref Range    Color, UA ORANGE (A) Straw/Yellow    Clarity, UA Clear Clear    Glucose, Ur 250 (A) Negative mg/dL    Bilirubin Urine SMALL (A) Negative    Ketones, Urine Negative Negative mg/dL    Specific Gravity, UA 1.018 1.005 - 1.030    Blood, Urine Negative Negative    pH, UA 5.0 5.0 - 8.0    Protein, UA Negative Negative mg/dL    Urobilinogen, Urine 1.0 <2.0 E.U./dL    Nitrite, Urine POSITIVE (A) Negative    Leukocyte Esterase, Urine SMALL (A) Negative   Microscopic Urinalysis    Collection Time: 06/09/22  4:20 AM   Result Value Ref Range    Bacteria, UA NEGATIVE (A) None Seen /HPF    Crystals, UA NEG (A) None Seen /HPF    Hyaline Casts, UA 2 0 - 8 /HPF    WBC, UA 6 (H) 0 - 5 /HPF    RBC, UA 2 0 - 4 /HPF    Epithelial Cells, UA 0 0 - 5 /HPF   POCT Glucose    Collection Time: 06/09/22  7:33 AM   Result Value Ref Range    POC Glucose 271 (H) 70 - 99 mg/dl    Performed on AccuChek    POCT Glucose    Collection Time: 06/09/22 12:26 PM   Result Value Ref Range    POC Glucose 259 (H) 70 - 99 mg/dl    Performed on AccuChek    POCT Glucose    Collection Time: 06/09/22  5:11 PM   Result Value Ref Range    POC Glucose 354 (H) 70 - 99 mg/dl    Performed on AccuChek        I/O last 3 completed shifts: In: 1415.8 [P.O.:300;  I.V.:1115.8]  Out: 650 [Urine:650]  I/O this shift:  In: 2200 [I.V.:2200]  Out: 230 [Urine:200; Blood:30]      Current Facility-Administered Medications:     insulin glargine (LANTUS) injection vial 5 Units, 5 Units, SubCUTAneous, Nightly, Denver Fields, MD  Blanca Bustillo ON 6/10/2022] iron sucrose (VENOFER) injection 200 mg, 200 mg, IntraVENous, Q24H, Denver Fields, MD    sodium chloride flush 0.9 % injection 5-40 mL, 5-40 mL, IntraVENous, 2 times per day, Denver Fields, MD    sodium chloride flush 0.9 % injection 5-40 mL, 5-40 mL, IntraVENous, PRN, Denver Fields, MD    0.9 % sodium chloride infusion, , IntraVENous, PRN, Denver Fields, MD    ondansetron (ZOFRAN-ODT) disintegrating tablet 4 mg, 4 mg, Oral, Q8H PRN **OR** ondansetron (ZOFRAN) injection 4 mg, 4 mg, IntraVENous, Q6H PRN, Denver Fields, MD    0.9 % sodium chloride infusion, , IntraVENous, Continuous, Denver Fields, MD, Last Rate: 75 mL/hr at 06/09/22 1711, New Bag at 06/09/22 1711    ceFAZolin (ANCEF) 2000 mg in dextrose 4 % 100 mL IVPB (premix), 2,000 mg, IntraVENous, Q8H, Denver Fields, MD    sennosides-docusate sodium (SENOKOT-S) 8.6-50 MG tablet 1 tablet, 1 tablet, Oral, BID, Denver Fields, MD    magnesium hydroxide (MILK OF MAGNESIA) 400 MG/5ML suspension 30 mL, 30 mL, Oral, Daily PRN, Denver Fields, MD    polyethylene glycol [facility]) packet 17 g, 17 g, Oral, Daily PRN, Denver Fields, MD    morphine (PF) injection 2 mg, 2 mg, IntraVENous, Q2H PRN **OR** morphine sulfate (PF) injection 4 mg, 4 mg, IntraVENous, Q2H PRN, Denver Fields, MD    oxyCODONE (ROXICODONE) immediate release tablet 5 mg, 5 mg, Oral, Q4H PRN **OR** oxyCODONE (ROXICODONE) immediate release tablet 10 mg, 10 mg, Oral, Q4H PRN, Denver Fields, MD    acetaminophen (TYLENOL) tablet 650 mg, 650 mg, Oral, Q4H PRN, Denver Fields, MD    apixaban Hailyabi Szymanski) tablet 2.5 mg, 2.5 mg, Oral, BID, Denver Fields, MD    sodium chloride flush 0.9 % injection 5-40 mL, 5-40 mL, IntraVENous, 2 times per day, Denver Fields, MD    sodium chloride flush 0.9 % injection 5-40 mL, 5-40 mL, IntraVENous, PRN, Denver Fields, MD    0.9 % sodium chloride infusion, , IntraVENous, PRN, Vic Castellanos MD    ondansetron (ZOFRAN-ODT) disintegrating tablet 4 mg, 4 mg, Oral, Q8H PRN **OR** ondansetron (ZOFRAN) injection 4 mg, 4 mg, IntraVENous, Q6H PRN, Vic Castellanos MD  [place]  [DISCONTINUED] acetaminophen (TYLENOL) tablet 650 mg, 650 mg, Oral, Q6H PRN **OR** acetaminophen (TYLENOL) suppository 650 mg, 650 mg, Rectal, Q6H PRN, Vic Castellanos MD    insulin lispro (HUMALOG) injection vial 0-6 Units, 0-6 Units, SubCUTAneous, TID WC, Vic Castellanos MD, 4 Units at 06/09/22 1711    insulin lispro (HUMALOG) injection vial 0-3 Units, 0-3 Units, SubCUTAneous, Nightly, Vic Castellanos MD, 1 Units at 06/08/22 2112    glucose chewable tablet 16 g, 4 tablet, Oral, PRN, Vic Castellanos MD    dextrose bolus 10% 125 mL, 125 mL, IntraVENous, PRN **OR** dextrose bolus 10% 250 mL, 250 mL, IntraVENous, PRN, Vic Castellanos MD    glucagon (rDNA) injection 1 mg, 1 mg, IntraMUSCular, PRN, Vic Castellanos MD    dextrose 5 % solution, 100 mL/hr, IntraVENous, PRN, Vic Castellanos MD    levothyroxine (SYNTHROID) tablet 200 mcg, 200 mcg, Oral, Daily, Vic Castellanos MD    losartan (COZAAR) tablet 100 mg, 100 mg, Oral, Daily, Vic Castellanos MD    PARoxetine (PAXIL) tablet 20 mg, 20 mg, Oral, QAM, Vic Castellanos MD    atorvastatin (LIPITOR) tablet 40 mg, 40 mg, Oral, Daily, Vic Castellanos MD        Assessment/plan  Principal Problem:    Open fracture of femur, type I or II, with routine healing  Active Problems:    Iron deficiency anemia    Other fracture of right femur, initial encounter for closed fracture (HCC)    Vitamin D deficiency    Vitamin B 12 deficiency    Hypothyroidism    Hyperkalemia    Type 2 diabetes mellitus with hyperglycemia ([address]    Essential hypertension    Gastroesophageal reflux disease without esophagitis  Resolved Problems:    * No resolved hospital problems.  *      Summary: 57-year-old female with hypertension, diabetes, presented as transfer from outside facility [facility]) due to right femur fracture after she had a fall while twisting her right knee with x-ray showing fracture with mild displacement and some angulation of distal femur. Patient was taken to the OR by orthopedics on 6/9 and underwent ORIF.     Femur fracture  S/p ORIF  Pain control  Postop PT/OT    Monitor for postop anemia    Iron deficiency  - Iron replacement    Hypertension  - Continue home losartan, monitor BP    DM  - Hold home oral medications for now  - POC glucose monitoring with sliding scale insulin correction, low-dose basal insulin, avoid hypoglycemia    Hypothyroidism  - Synthroid    DVT prophylaxis: Low-dose Eliquis per orthopedics    Pati Zavala MD 6/9/2022 6:20 PM

## 2022-06-09 NOTE — ANESTHESIA PRE PROCEDURE
Department of Anesthesiology  Preprocedure Note       Name:  Xiao Dennis   Age:  80 y.o.  :  1940                                          MRN:  [id number]         Date:  2022      Surgeon: Hannah Baxter):  Quinten Eubanks MD    Procedure: Procedure(s): FEMUR OPEN REDUCTION INTERNAL FIXATION    Medications prior to admission:   Prior to Admission medications    Medication Sig Start Date End Date Taking?  Authorizing Provider   sitaGLIPtan-metformin (JANUMET)  MG per tablet Take 1 tablet by mouth daily    Historical Provider, MD   simvastatin (ZOCOR) 40 MG tablet Take 40 mg by mouth nightly    Historical Provider, MD   glipiZIDE (GLUCOTROL) 10 MG tablet Take 10 mg by mouth 2 times daily (before meals)    Historical Provider, MD   losartan (COZAAR) 100 MG tablet Take 100 mg by mouth daily    Historical Provider, MD   levothyroxine (SYNTHROID) 200 MCG tablet Take 200 mcg by mouth Daily    Historical Provider, MD   PARoxetine (PAXIL) 20 MG tablet Take 20 mg by mouth every morning    Historical Provider, MD   hydrochlorothiazide (MICROZIDE) 12.5 MG capsule Take 12.5 mg by mouth every morning    Historical Provider, MD       Current medications:    Current Facility-Administered Medications   Medication Dose Route Frequency Provider Last Rate Last Admin    magnesium sulfate 4000 mg in 100 mL IVPB premix  4,000 mg IntraVENous Once Terrence Bedoya MD 25 mL/hr at 22 1045 4,000 mg at 22 1045    insulin glargine (LANTUS) injection vial 5 Units  5 Units SubCUTAneous Nightly Terrence Bedoya MD       Mar[place] Courtney Shivam ON 6/10/2022] iron sucrose (VENOFER) injection 200 mg  200 mg IntraVENous Q24H Terrence Bedoya MD        sodium chloride flush 0.9 % injection 5-40 mL  5-40 mL IntraVENous 2 times per day Dellar Beverly, APRN - CNP        sodium chloride flush 0.9 % injection 5-40 mL  5-40 mL IntraVENous PRN Dellar Beverly, APRN - CNP        0.9 % sodium chloride infusion   IntraVENous PRN Dellar Beverly, APRN - CNP        ondansetron (ZOFRAN-ODT) disintegrating tablet 4 mg  4 mg Oral Q8H PRN BRADEN Garcia CNP        Or    ondansetron [facility]) injection 4 mg  4 mg IntraVENous Q6H PRN BRADEN Gracia CNP        polyethylene glycol (GLYCOLAX) packet 17 g  17 g Oral Daily PRN BRADEN Garcia CNP        acetaminophen (TYLENOL) tablet 650 mg  650 mg Oral Q6H PRN BRADEN Garcia CNP        Or    acetaminophen (TYLENOL) suppository 650 mg  650 mg Rectal Q6H PRN BRADEN Garcia CNP        0.9 % sodium chloride infusion   IntraVENous Continuous BRADEN Garcia  mL/hr at 06/09/22 0417 New Bag at 06/09/22 0417    morphine (PF) injection 2 mg  2 mg IntraVENous Q2H PRN BRADEN Garcia CNP   2 mg at 06/09/22 1239    insulin lispro (HUMALOG) injection vial 0-6 Units  0-6 Units SubCUTAneous TID WC BRADEN Garcia CNP   2 Units at 06/08/22 1851    insulin lispro (HUMALOG) injection vial 0-3 Units  0-3 Units SubCUTAneous Nightly BRADEN Garcia CNP   1 Units at 06/08/22 2112    glucose chewable tablet 16 g  4 tablet Oral PRN BRADEN Garcia CNP        dextrose bolus 10% 125 mL  125 mL IntraVENous PRN BRADEN Garcia CNP        Or    dextrose bolus 10% 250 mL  250 mL IntraVENous PRN BRADEN Garcia CNP        glucagon (rDNA) injection 1 mg  1 mg IntraMUSCular PRN BRADEN Garcia CNP        dextrose 5 % solution  100 mL/hr IntraVENous PRN BRADEN Garcia CNP        levothyroxine (SYNTHROID) tablet 200 mcg  200 mcg Oral Daily BRADEN Garcia CNP        losartan (COZAAR) tablet 100 mg  100 mg Oral Daily BRADEN Garcia CNP        PARoxetine (PAXIL) tablet 20 mg  20 mg Oral QAM BRADEN Garcia CNP        atorvastatin (LIPITOR) tablet 40 mg  40 mg Oral Daily BRADEN Garcai CNP        enoxaparin Sodium (LOVENOX) injection 30 mg  30 mg SubCUTAneous BID BRADEN Garcia CNP   30 mg at 06/08/22 2110       Allergies:     Allergies Allergen Reactions    Doxycycline Rash       Problem List:    Patient Active Problem List   Diagnosis Code    Primary osteoarthritis of right hip M16.11    Iron deficiency anemia D50.9    Type 2 diabetes mellitus with hyperglycemia ([facility] E11.65    Essential hypertension I10    Gastroesophageal reflux disease without esophagitis K21.9    Other fracture of right femur, initial encounter for closed fracture ([facility] S72.8X1A    Vitamin D deficiency E55.9    Vitamin B 12 deficiency E53.8    Hypothyroidism E03.9    Hyperkalemia E87.5    Open fracture of femur, type I or II, with routine healing S72.90XE       Past Medical History:        Diagnosis Date    Acid reflux     Arthritis     Diabetes mellitus ([facility]     Hx of blood clots 11/2018    right Groin     Hyperlipidemia     Hypertension     Thyroid disease        Past Surgical History:        Procedure Laterality Date    CHOLECYSTECTOMY      EYE SURGERY Bilateral     CATARACTS REMOVED WITH LENS IMPLANTS    LITHOTRIPSY      CYSTOSCOPY WITH LITHO    TOTAL HIP ARTHROPLASTY Right 7/25/2019    RIGHT TOTAL HIP REPLACEMENT performed by Wes Pierre MD at [address] [place] [address] Bilateral 2012    [place]    TOTAL THYROIDECTOMY      TUBAL LIGATION         Social History:    Social History     Tobacco Use    Smoking status: Never Smoker    Smokeless tobacco: Never Used   Substance Use Topics    Alcohol use: Never                                Counseling given: Not Answered      Vital Signs (Current):   Vitals:    06/09/22 0414 06/09/22 0731 06/09/22 0801 06/09/22 1228   BP: 121/65 (!) 128/52  (!) 116/57   Pulse: 72 76  72   Resp: 18 18 16 18   Temp: 97.5 °F (36.4 °C) 97.5 °F (36.4 °C)  97.5 °F (36.4 °C)   TempSrc: Temporal Temporal  Temporal   SpO2: 94%   96%   Weight: 231 lb 6.4 oz (105 kg)      Height: 5' 9\" (1.753 m)                                                 BP Readings from Last 3 Encounters:   06/09/22 (!) 116/57 06/23/20 (!) 162/94   12/04/19 (!) 97/58       NPO Status:                                                                                 BMI:   Wt Readings from Last 3 Encounters:   06/09/22 231 lb 6.4 oz (105 kg)   06/23/20 254 lb (115.2 kg)   12/03/19 250 lb (113.4 kg)     Body mass index is 34.17 kg/m². CBC:   Lab Results   Component Value Date    WBC 8.9 06/09/2022    RBC 3.91 06/09/2022    HGB 11.3 06/09/2022    HCT 37.8 06/09/2022    MCV 96.7 06/09/2022    RDW 13.5 06/09/2022     06/09/2022       CMP:   Lab Results   Component Value Date     06/09/2022    K 5.2 06/09/2022     06/09/2022    CO2 23 06/09/2022    BUN 17 06/09/2022    CREATININE 0.8 06/09/2022    GFRAA >59 06/09/2022    LABGLOM >60 06/09/2022    GLUCOSE 194 06/09/2022    PROT 5.5 09/24/2012    CALCIUM 8.7 06/09/2022    ALKPHOS 48 09/24/2012    AST 49 09/24/2012    ALT 46 09/24/2012       POC Tests:   Recent Labs     06/09/22  1226   POCGLU 259*       Coags:   Lab Results   Component Value Date    PROTIME 12.7 06/08/2022    INR 0.96 06/08/2022    APTT 33.8 07/05/2019       HCG (If Applicable): No results found for: PREGTESTUR, PREGSERUM, HCG, HCGQUANT     ABGs: No results found for: PHART, PO2ART, ADV3CDU, OJA2DLG, BEART, N8TMMTXP     Type & Screen (If Applicable):  No results found for: LABABO, LABRH    Drug/Infectious Status (If Applicable):  No results found for: HIV, HEPCAB    COVID-19 Screening (If Applicable): No results found for: COVID19        Anesthesia Evaluation  Patient summary reviewed and Nursing notes reviewed no history of anesthetic complications:   Airway: Mallampati: II  TM distance: >3 FB   Neck ROM: full  Mouth opening: > = 3 FB   Dental: normal exam         Pulmonary:Negative Pulmonary ROS and normal exam  breath sounds clear to auscultation      (-) shortness of breath and not a current smoker          Patient did not smoke on day of surgery.                 ROS comment: covid 3 wks ago Cardiovascular:    (+) hypertension:, hyperlipidemia    (-) CAD,  angina and  CHF    NYHA Classification: I  ECG reviewed  Rhythm: regular  Rate: normal           Beta Blocker:  Not on Beta Blocker         Neuro/Psych:   Negative Neuro/Psych ROS     (-) seizures, CVA and depression/anxiety            GI/Hepatic/Renal: Neg GI/Hepatic/Renal ROS  (+) GERD:, PUD,      (-) hiatal hernia       Endo/Other: Negative Endo/Other ROS   (+) DiabetesType II DM, , hypothyroidism::., electrolyte abnormalities, . Pt had PAT visit. Abdominal:   (+) obese,     Abdomen: soft. Vascular:   + DVT, . Other Findings:           Anesthesia Plan      general     ASA 4 - emergent     (Iv zofran within 30 min of closing )  Induction: intravenous. BIS  MIPS: Postoperative opioids intended and Prophylactic antiemetics administered. Anesthetic plan and risks discussed with patient. Use of blood products discussed with patient whom. Plan discussed with CRNA.     Attending anesthesiologist reviewed and agrees with Pre Eval content                Gifty Oconnor MD   6/9/2022

## 2022-06-09 NOTE — BRIEF OP NOTE
Brief Postoperative Note      Patient: Epifanio Cartagena  YOB: 1940  MRN: [id number]    Date of Procedure: 6/9/2022    Pre-Op Diagnosis: Periprosthetic right distal femur fracture above a total knee prosthesis and below a total hip prosthesis    Post-Op Diagnosis: Same       Procedure(s): FEMUR OPEN REDUCTION INTERNAL FIXATION    Surgeon(s):  Whit Humphries MD    Assistant:  * No surgical staff found *    Anesthesia: General    Estimated Blood Loss (mL): less than 549     Complications: None    Specimens:   * No specimens in log *    Implants:  Implant Name Type Inv.  Item Serial No.  Lot No. LRB No. Used Action   PLATE BNE [id number] 12 H R CNDYL S STL CRV ZARIA ANG ZION COMPR - [id number]  PLATE BNE [id number] 12 H R CNDYL S STL CRV ZARIA ANG ZION COMPR  [employer]WD  Right 1 Implanted   SCREW BNE L38MM DIA5MM CNDYL S STL ST ZARIA ANG ZION T25 - [id number]  SCREW BNE L38MM DIA5MM CNDYL S STL ST ZARIA ANG ZION T25  DE[employer] USA-WD  Right 1 Implanted   SCREW BNE L40MM DIA5MM CNDYL S STL ST ZARIA ANG ZION T25 - [id number]  SCREW BNE L40MM DIA5MM CNDYL S STL ST ZARIA ANG ZION T25  DE[employer] USA-WD  Right 1 Implanted   SCREW BNE L42MM DIA5MM CNDYL S STL ST ZARIA ANG ZION T25 - [id number]  SCREW BNE L42MM DIA5MM CNDYL S STL ST ZARIA ANG ZION T25  DEPUY Flexenclosure USA-WD  Right 1 Implanted   SCREW BNE L46MM DIA5MM CNDYL S STL ST ZARIA ANG ZION T25 - [id number]  SCREW BNE L46MM DIA5MM CNDYL S STL ST ZARIA ANG ZION T25  [employer]WD  Right 1 Implanted   SCREW BNE L40MM DIA5MM CNDYL S STL ST ZARIA ANG BRITTA ZION FULL - [id number]  SCREW BNE L40MM DIA5MM CNDYL S STL ST ZARIA ANG BRITTA ZION FULL  DEPUY Flexenclosure [place]-WD  Right 1 Implanted   SCREW BNE L55MM DIA5MM S STL ST ZARIA ANG BRITTA ZION THRD - [id number]  SCREW BNE L55MM DIA5MM S STL ST ZARIA ANG BRITTA ZION THRD  DE[employer]inut[employer] SYN[employer]ES [place]-WD  Right 1 Implanted   SCREW BNE L70MM DIA5MM S STL SELF DRL ZARIA ANG BRITTA ZION FULL - [id number]  SCREW BNE L70MM DIA5MM S STL SELF DRL ZARIA ANG BRITTA ZION FULL  DEPUY SYNTHES [place]-WD  Right 1 Implanted   SCREW BNE L75MM DIA5MM CNDYL S STL ST ZARIA ANG BRITTA ZION FULL - [id number]  SCREW BNE L75MM DIA5MM CNDYL S STL ST ZARIA ANG BRITTA ZION FULL  DEPUY SYNTHES [place]-WD  Right 2 Implanted   SCREW BNE L80MM DIA5MM CNDYL S STL ST ZARIA ANG BRITTA ZION FULL - [id number]  SCREW BNE L80MM DIA5MM CNDYL S STL ST ZARIA ANG BRITTA ZION FULL  DEPUY SYNTHES [place]-WD  Right 1 Implanted         Drains:   External Urinary Catheter (Active)   Placement Repositioned 06/08/22 2149   Perineal Care Yes 06/08/22 2149   Suction 40 mmgHg continuous 06/08/22 2149   Urine Color Danielle 06/08/22 2149   Urine Appearance Clear 06/08/22 2149   Output (mL) 250 mL 06/09/22 0414       Findings: Displaced comminuted periprosthetic supracondylar right distal femur fracture above a total knee replacement and below a total hip replacement without intra-condylar extension    Electronically signed by Jonelle Campbell MD on 6/9/2022 at 2:50 PM

## 2022-06-09 NOTE — CONSULTS
Orthopaedic Inpatient Consultation    NAME:  Lara Green   :   MRN: [id number]    2022  5:03 PM    Requesting Physician: BRADEN Cabrera    CHIEF COMPLAINT:  right prosthetic distal femur fracture    HISTORY OF PRESENT ILLNESS:   The patient is a 80 y.o. female who was standing outside and twisted resulting in a loud pop in the acute onset of pain and inability to bear weight through her right thigh. X-rays obtained at an outside facility demonstrated a displaced metadiaphyseal distal femur periprosthetic fracture above a total knee replacement. Pain is located in the right distal thigh and rated a 3/5, constant, dull, worse with movement, better with rest and medication. There are no associated symptoms. Past Medical History:        Diagnosis Date    Acid reflux     Arthritis     Diabetes mellitus ([address]     Hx of blood clots 2018    right Groin     Hyperlipidemia     Hypertension     Thyroid disease        Past Surgical History:        Procedure Laterality Date    CHOLECYSTECTOMY      EYE SURGERY Bilateral     CATARACTS REMOVED WITH LENS IMPLANTS    LITHOTRIPSY      CYSTOSCOPY WITH LITHO    TOTAL HIP ARTHROPLASTY Right 2019    RIGHT TOTAL HIP REPLACEMENT performed by Leatha Ibarra MD at [facility] Bilateral 2012    [place]    TOTAL THYROIDECTOMY      TUBAL LIGATION         Current Medications:   Prior to Admission medications    Medication Sig Start Date End Date Taking?  Authorizing Provider   sitaGLIPtan-metformin (JANUMET)  MG per tablet Take 1 tablet by mouth daily    Historical Provider, MD   simvastatin (ZOCOR) 40 MG tablet Take 40 mg by mouth nightly    Historical Provider, MD   glipiZIDE (GLUCOTROL) 10 MG tablet Take 10 mg by mouth 2 times daily (before meals)    Historical Provider, MD   losartan (COZAAR) 100 MG tablet Take 100 mg by mouth daily    Historical Provider, MD   levothyroxine (SYNTHROID) 200 MCG tablet Take 200 mcg by mouth Daily    Historical Provider, MD   PARoxetine (PAXIL) 20 MG tablet Take 20 mg by mouth every morning    Historical Provider, MD   hydrochlorothiazide (MICROZIDE) 12.5 MG capsule Take 12.5 mg by mouth every morning    Historical Provider, MD       Allergies:  Doxycycline    Social History:   Social History     Socioeconomic History    Marital status:      Spouse name: Not on file    Number of children: Not on file    Years of education: Not on file    Highest education level: Not on file   Occupational History    Not on file   Tobacco Use    Smoking status: Never Smoker    Smokeless tobacco: Never Used   Vaping Use    Vaping Use: Never used   Substance and Sexual Activity    Alcohol use: Never    Drug use: Never    Sexual activity: Not on file   Other Topics Concern    Not on file   Social History Narrative    Not on file     Social Determinants of Health     Financial Resource Strain:     Difficulty of Paying Living Expenses: Not on file   Food Insecurity:     Worried About Running Out of Food in the Last Year: Not on file    Chely of Food in the Last Year: Not on file   Transportation Needs:     Lack of Transportation (Medical): Not on file    Lack of Transportation (Non-Medical):  Not on file   Physical Activity:     Days of Exercise per Week: Not on file    Minutes of Exercise per Session: Not on file   Stress:     Feeling of Stress : Not on file   Social Connections:     Frequency of Communication with Friends and Family: Not on file    Frequency of Social Gatherings with Friends and Family: Not on file    Attends [employer] Services: Not on file    Active Member of Clubs or Organizations: Not on file    Attends Club or Organization Meetings: Not on file    Marital Status: Not on file   Intimate Partner Violence:     Fear of Current or Ex-Partner: Not on file    Emotionally Abused: Not on file    Physically Abused: Not on file    Sexually Abused: Not on file   Housing Stability:     Unable to Pay for Housing in the Last Year: Not on file    Number of Places Lived in the Last Year: Not on file    Unstable Housing in the Last Year: Not on file       Family History:   Family History   Problem Relation Age of Onset    Diabetes Mother     Vaginal Cancer Mother     Pancreatic Cancer Sister     Pancreatic Cancer Brother     Breast Cancer Sister     Deep Vein Thrombosis Daughter        REVIEW OF SYSTEMS:  14 point review of systems has been reviewed from the patient's emergency room visit, reviewed with the patient on today's date with no new changes. PHYSICAL EXAM:      Physical Examination:  Vitals:   Vitals:    06/09/22 0414 06/09/22 0731 06/09/22 0801 06/09/22 1228   BP: 121/65 (!) 128/52  (!) 116/57   Pulse: 72 76  72   Resp: 18 18 16 18   Temp: 97.5 °F (36.4 °C) 97.5 °F (36.4 °C)  97.5 °F (36.4 °C)   TempSrc: Temporal Temporal  Temporal   SpO2: 94%   96%   Weight: 231 lb 6.4 oz (105 kg)      Height: 5' 9\" (1.753 m)        General:  Appears stated age, no distress. Orientation:  Alert and oriented to time, place, and person. Mood and Affect:  Cooperative and pleasant. Gait:  Resting comfortably in bed. Cardiovascular:  Symmetric 1-2 plus pulses in upper and lower extremities. Lymph:  No cervical or inguinal lymphadenopathy noted. Sensation:  Grossly intact to light touch. DTR:  Normal, no pathologic reflexes. Coordination/balance:  Normal    Musculoskeletal:  Right upper extremity exam:  There is no tenderness to palpation about the shoulder, elbow, wrist or hand. Unrestricted full motion is present. Stability is normal with provocative tests, 5/5 strength, and skin is normal.      Left upper extremity exam:  There is no tenderness to palpation about the shoulder, elbow, wrist or hand. Unrestricted full motion is present.   Stability is normal with provocative tests, 5/5 strength, and skin is normal.     Right lower extremity exam:  There is no tenderness to palpation about the hip, knee, ankle or foot but the patient does have discomfort with palpation about the distal thigh proximal to the knee. The overlying skin is intact. Muscle compartments are soft and compressible. She does have a well-healed midline incision consistent with a previous total knee replacement. Sensation is preserved distally. Brisk capillary refill is noted. Left lower extremity exam:  There is no tenderness to palpation about the hip, knee, ankle or foot. Unrestricted full motion is present.   Stability is normal with provocative tests, 5/5 strength, and skin is normal.      DATA:    CBC with Differential:    Lab Results   Component Value Date    WBC 8.9 06/09/2022    RBC 3.91 06/09/2022    HGB 11.3 06/09/2022    HCT 37.8 06/09/2022     06/09/2022    MCV 96.7 06/09/2022    MCH 28.9 06/09/2022    MCHC 29.9 06/09/2022    RDW 13.5 06/09/2022    LYMPHOPCT 22.1 07/05/2019    MONOPCT 7.7 07/05/2019    BASOPCT 0.8 07/05/2019    MONOSABS 0.60 07/05/2019    LYMPHSABS 1.6 07/05/2019    EOSABS 0.40 07/05/2019    BASOSABS 0.10 07/05/2019     CMP:    Lab Results   Component Value Date     06/09/2022    K 5.2 06/09/2022     06/09/2022    CO2 23 06/09/2022    BUN 17 06/09/2022    CREATININE 0.8 06/09/2022    GFRAA >59 06/09/2022    LABGLOM >60 06/09/2022    GLUCOSE 194 06/09/2022    PROT 5.5 09/24/2012    CALCIUM 8.7 06/09/2022    ALKPHOS 48 09/24/2012    AST 49 09/24/2012    ALT 46 09/24/2012     BMP:    Lab Results   Component Value Date     06/09/2022    K 5.2 06/09/2022     06/09/2022    CO2 23 06/09/2022    BUN 17 06/09/2022    CREATININE 0.8 06/09/2022    CALCIUM 8.7 06/09/2022    GFRAA >59 06/09/2022    LABGLOM >60 06/09/2022    GLUCOSE 194 06/09/2022           --------------------------------------------------------------------------------------------------------------------    Assessment: Acute traumatic displaced periprosthetic distal femur fracture of the right lower extremity above a total knee replacement, initial encounter for closed fracture    Plan:  1) to OR for ORIF of right periprosthetic distal femur - we discussed risks, benefits, and alternatives and patient wishes to proceed  2) Admit postop for pain control, PT/OT  3) patient will be nonweightbearing with no range of motion through right knee and knee immobilizer postoperative     Electronically signed by Arnold Moraes MD on 6/9/2022 at 1:02 PM

## 2022-06-09 NOTE — PROGRESS NOTES
Automatic Dose Adjustment of                Subcutaneous Anticoagulant for Prophylaxis    Kelsey Choi is a 80 y.o. female. Recent Labs     06/08/22  1815   CREATININE 0.8       CrCl calculated to be 71 ml/min    Weight:  Wt Readings from Last 1 Encounters:   06/23/20 254 lb (115.2 kg)   108 kg at 5/25/22 outpatient visit in [address] [place] [address] Pkwy has adjusted subcutaneous anticoagulant for prophylaxis to Enoxaparin 30 mg SC twice daily based on the patient's weight and estimated CrCl per [facility] policy.                Electronically signed by Mike Sotelo [facility] on 6/8/2022 at 8:49 PM

## 2022-06-09 NOTE — ANESTHESIA POSTPROCEDURE EVALUATION
Department of Anesthesiology  Postprocedure Note    Patient: Maris Bragg  MRN: [id number]  YOB: 1940  Date of evaluation: 6/9/2022  Time:  3:14 PM     Procedure Summary     Date: 06/09/22 Room / Location: [address]    Anesthesia Start: 1183 Anesthesia Stop: 1469    Procedure: FEMUR OPEN REDUCTION INTERNAL FIXATION (Right Leg Upper) Diagnosis:       Type I or II open fracture of head of right femur with routine healing, subsequent encounter      (.)    Surgeons: Qian Bauer MD Responsible Provider: BRADEN Chandler CRNA    Anesthesia Type: general ASA Status: 4 - Emergent          Anesthesia Type: No value filed. Duy Phase I:      Duy Phase II:      Last vitals: Reviewed and per EMR flowsheets.        Anesthesia Post Evaluation    Patient location during evaluation: PACU  Patient participation: complete - patient participated  Level of consciousness: sleepy but conscious  Pain score: 0  Airway patency: patent  Nausea & Vomiting: no vomiting and no nausea  Complications: no  Cardiovascular status: hemodynamically stable  Respiratory status: acceptable, spontaneous ventilation and face mask  Hydration status: stable

## 2022-06-10 LAB
ANION GAP SERPL CALCULATED.3IONS-SCNC: 10 MMOL/L (ref 7–19)
BUN BLDV-MCNC: 15 MG/DL (ref 8–23)
CALCIUM SERPL-MCNC: 8.2 MG/DL (ref 8.8–10.2)
CHLORIDE BLD-SCNC: 98 MMOL/L (ref 98–111)
CO2: 24 MMOL/L (ref 22–29)
CREAT SERPL-MCNC: 0.8 MG/DL (ref 0.5–0.9)
GFR AFRICAN AMERICAN: >59
GFR NON-AFRICAN AMERICAN: >60
GLUCOSE BLD-MCNC: 285 MG/DL (ref 70–99)
GLUCOSE BLD-MCNC: 287 MG/DL (ref 70–99)
GLUCOSE BLD-MCNC: 302 MG/DL (ref 70–99)
GLUCOSE BLD-MCNC: 310 MG/DL (ref 70–99)
GLUCOSE BLD-MCNC: 348 MG/DL (ref 74–109)
HCT VFR BLD CALC: 33.9 % (ref 37–47)
HEMOGLOBIN: 10.5 G/DL (ref 12–16)
MCH RBC QN AUTO: 28.5 PG (ref 27–31)
MCHC RBC AUTO-ENTMCNC: 31 G/DL (ref 33–37)
MCV RBC AUTO: 92.1 FL (ref 81–99)
PDW BLD-RTO: 13.3 % (ref 11.5–14.5)
PERFORMED ON: ABNORMAL
PLATELET # BLD: 148 K/UL (ref 130–400)
PMV BLD AUTO: 11.2 FL (ref 9.4–12.3)
POTASSIUM REFLEX MAGNESIUM: 5.6 MMOL/L (ref 3.5–5)
POTASSIUM SERPL-SCNC: 5.6 MMOL/L (ref 3.5–5)
RBC # BLD: 3.68 M/UL (ref 4.2–5.4)
SODIUM BLD-SCNC: 132 MMOL/L (ref 136–145)
WBC # BLD: 8.5 K/UL (ref 4.8–10.8)

## 2022-06-10 PROCEDURE — 85027 COMPLETE CBC AUTOMATED: CPT

## 2022-06-10 PROCEDURE — 80048 BASIC METABOLIC PNL TOTAL CA: CPT

## 2022-06-10 PROCEDURE — 6370000000 HC RX 637 (ALT 250 FOR IP): Performed by: STUDENT IN AN ORGANIZED HEALTH CARE EDUCATION/TRAINING PROGRAM

## 2022-06-10 PROCEDURE — 6370000000 HC RX 637 (ALT 250 FOR IP): Performed by: ORTHOPAEDIC SURGERY

## 2022-06-10 PROCEDURE — 6360000002 HC RX W HCPCS: Performed by: ORTHOPAEDIC SURGERY

## 2022-06-10 PROCEDURE — 1210000000 HC MED SURG R&B

## 2022-06-10 PROCEDURE — 97116 GAIT TRAINING THERAPY: CPT

## 2022-06-10 PROCEDURE — 97530 THERAPEUTIC ACTIVITIES: CPT

## 2022-06-10 PROCEDURE — 97161 PT EVAL LOW COMPLEX 20 MIN: CPT

## 2022-06-10 PROCEDURE — 36415 COLL VENOUS BLD VENIPUNCTURE: CPT

## 2022-06-10 PROCEDURE — 97165 OT EVAL LOW COMPLEX 30 MIN: CPT

## 2022-06-10 PROCEDURE — 82947 ASSAY GLUCOSE BLOOD QUANT: CPT

## 2022-06-10 PROCEDURE — 97535 SELF CARE MNGMENT TRAINING: CPT

## 2022-06-10 RX ORDER — HYDROCHLOROTHIAZIDE 12.5 MG/1
12.5 CAPSULE, GELATIN COATED ORAL EVERY MORNING
Status: DISCONTINUED | OUTPATIENT
Start: 2022-06-11 | End: 2022-06-14 | Stop reason: HOSPADM

## 2022-06-10 RX ORDER — INSULIN LISPRO 100 [IU]/ML
0-12 INJECTION, SOLUTION INTRAVENOUS; SUBCUTANEOUS
Status: DISCONTINUED | OUTPATIENT
Start: 2022-06-10 | End: 2022-06-14 | Stop reason: HOSPADM

## 2022-06-10 RX ORDER — INSULIN GLARGINE 100 [IU]/ML
10 INJECTION, SOLUTION SUBCUTANEOUS NIGHTLY
Status: DISCONTINUED | OUTPATIENT
Start: 2022-06-10 | End: 2022-06-11

## 2022-06-10 RX ORDER — INSULIN LISPRO 100 [IU]/ML
0-6 INJECTION, SOLUTION INTRAVENOUS; SUBCUTANEOUS NIGHTLY
Status: DISCONTINUED | OUTPATIENT
Start: 2022-06-10 | End: 2022-06-14 | Stop reason: HOSPADM

## 2022-06-10 RX ORDER — FERROUS SULFATE 325(65) MG
325 TABLET ORAL EVERY OTHER DAY
Status: DISCONTINUED | OUTPATIENT
Start: 2022-06-11 | End: 2022-06-14 | Stop reason: HOSPADM

## 2022-06-10 RX ORDER — INSULIN LISPRO 100 [IU]/ML
4 INJECTION, SOLUTION INTRAVENOUS; SUBCUTANEOUS
Status: DISCONTINUED | OUTPATIENT
Start: 2022-06-10 | End: 2022-06-14 | Stop reason: HOSPADM

## 2022-06-10 RX ADMIN — INSULIN LISPRO 3 UNITS: 100 INJECTION, SOLUTION INTRAVENOUS; SUBCUTANEOUS at 21:06

## 2022-06-10 RX ADMIN — INSULIN LISPRO 4 UNITS: 100 INJECTION, SOLUTION INTRAVENOUS; SUBCUTANEOUS at 16:37

## 2022-06-10 RX ADMIN — SODIUM ZIRCONIUM CYCLOSILICATE 10 G: 10 POWDER, FOR SUSPENSION ORAL at 16:38

## 2022-06-10 RX ADMIN — OXYCODONE 10 MG: 5 TABLET ORAL at 15:19

## 2022-06-10 RX ADMIN — DOCUSATE SODIUM 50 MG AND SENNOSIDES 8.6 MG 1 TABLET: 8.6; 5 TABLET, FILM COATED ORAL at 07:44

## 2022-06-10 RX ADMIN — INSULIN LISPRO 4 UNITS: 100 INJECTION, SOLUTION INTRAVENOUS; SUBCUTANEOUS at 16:38

## 2022-06-10 RX ADMIN — DOCUSATE SODIUM 50 MG AND SENNOSIDES 8.6 MG 1 TABLET: 8.6; 5 TABLET, FILM COATED ORAL at 21:06

## 2022-06-10 RX ADMIN — OXYCODONE 10 MG: 5 TABLET ORAL at 21:05

## 2022-06-10 RX ADMIN — INSULIN GLARGINE 10 UNITS: 100 INJECTION, SOLUTION SUBCUTANEOUS at 21:06

## 2022-06-10 RX ADMIN — ACETAMINOPHEN 650 MG: 325 TABLET ORAL at 21:05

## 2022-06-10 RX ADMIN — INSULIN LISPRO 4 UNITS: 100 INJECTION, SOLUTION INTRAVENOUS; SUBCUTANEOUS at 12:14

## 2022-06-10 RX ADMIN — PAROXETINE HYDROCHLORIDE 20 MG: 20 TABLET, FILM COATED ORAL at 07:43

## 2022-06-10 RX ADMIN — INSULIN LISPRO 3 UNITS: 100 INJECTION, SOLUTION INTRAVENOUS; SUBCUTANEOUS at 09:20

## 2022-06-10 RX ADMIN — OXYCODONE 5 MG: 5 TABLET ORAL at 04:40

## 2022-06-10 RX ADMIN — LOSARTAN POTASSIUM 100 MG: 100 TABLET, FILM COATED ORAL at 07:44

## 2022-06-10 RX ADMIN — LEVOTHYROXINE SODIUM 200 MCG: 100 TABLET ORAL at 05:39

## 2022-06-10 RX ADMIN — OXYCODONE 10 MG: 5 TABLET ORAL at 09:03

## 2022-06-10 RX ADMIN — CEFAZOLIN SODIUM 2000 MG: 2 INJECTION, SOLUTION INTRAVENOUS at 04:42

## 2022-06-10 RX ADMIN — ATORVASTATIN CALCIUM 40 MG: 40 TABLET, FILM COATED ORAL at 07:44

## 2022-06-10 RX ADMIN — APIXABAN 2.5 MG: 2.5 TABLET, FILM COATED ORAL at 07:44

## 2022-06-10 RX ADMIN — APIXABAN 2.5 MG: 2.5 TABLET, FILM COATED ORAL at 21:05

## 2022-06-10 ASSESSMENT — PAIN DESCRIPTION - LOCATION
LOCATION: KNEE
LOCATION: HIP;KNEE
LOCATION: HIP
LOCATION: KNEE

## 2022-06-10 ASSESSMENT — PAIN DESCRIPTION - PAIN TYPE: TYPE: SURGICAL PAIN

## 2022-06-10 ASSESSMENT — PAIN DESCRIPTION - ONSET: ONSET: ON-GOING

## 2022-06-10 ASSESSMENT — PAIN - FUNCTIONAL ASSESSMENT
PAIN_FUNCTIONAL_ASSESSMENT: PREVENTS OR INTERFERES SOME ACTIVE ACTIVITIES AND ADLS
PAIN_FUNCTIONAL_ASSESSMENT: PREVENTS OR INTERFERES SOME ACTIVE ACTIVITIES AND ADLS

## 2022-06-10 ASSESSMENT — PAIN DESCRIPTION - ORIENTATION
ORIENTATION: RIGHT

## 2022-06-10 ASSESSMENT — PAIN SCALES - GENERAL
PAINLEVEL_OUTOF10: 3
PAINLEVEL_OUTOF10: 3
PAINLEVEL_OUTOF10: 8

## 2022-06-10 ASSESSMENT — PAIN DESCRIPTION - DESCRIPTORS
DESCRIPTORS: ACHING
DESCRIPTORS: ACHING
DESCRIPTORS: ACHING;DISCOMFORT
DESCRIPTORS: THROBBING;ACHING

## 2022-06-10 ASSESSMENT — PAIN DESCRIPTION - FREQUENCY: FREQUENCY: INTERMITTENT

## 2022-06-10 ASSESSMENT — PAIN SCALES - WONG BAKER: WONGBAKER_NUMERICALRESPONSE: 0

## 2022-06-10 NOTE — CARE COORDINATION
Patient has accepted bed offer from [facility]. Patient can discharge Sunday or Monday. Patient will need an updated Negative Covid Swab prior to discharge.     [facility]  Phone: [phone]  Fax: [phone] E-fax for admissions  Electronically signed by Kevan Gilman RN, BSN on 6/10/2022 at 1:09 PM

## 2022-06-10 NOTE — PROGRESS NOTES
Occupational Therapy Initial Assessment  Date: 6/10/2022   Patient Name: Stephania Giron  MRN: [id number]     : 1940    Date of Service: 6/10/2022    Discharge Recommendations:  Patient would benefit from continued therapy after discharge       Assessment   Assessment: Evaluation completed and tx initiated. The patient will need further therapy to upgrade functional status. Good potential to upgrade skills. Treatment Diagnosis: Right femur fx s/p ORIF  History: Previous right knee replacement and right hip replacement     Activity Tolerance  Activity Tolerance: Patient Tolerated treatment well           Patient Diagnosis(es): The encounter diagnosis was Other closed fracture of distal end of right femur, initial encounter ([facility] [place] [address] has a past medical history of Acid reflux, Arthritis, Diabetes mellitus ([facility] [place] [address] Hx of blood clots, Hyperlipidemia, Hypertension, and Thyroid disease. has a past surgical history that includes Total knee arthroplasty (Bilateral, ); Lithotripsy; Cholecystectomy; Tubal ligation; Total Thyroidectomy; eye surgery (Bilateral); Total hip arthroplasty (Right, 2019); and Femur fracture surgery (Right, 2022).     Treatment Diagnosis: Right femur fx s/p ORIF      Restrictions  Restrictions/Precautions  Restrictions/Precautions: Weight Bearing  Required Braces or Orthoses?: Yes  Lower Extremity Weight Bearing Restrictions  Right Lower Extremity Weight Bearing: Non Weight Bearing  Left Lower Extremity Weight Bearing: Weight Bearing As Tolerated  Required Braces or Orthoses  Right Lower Extremity Brace: Knee Immobilizer  RLE Brace Type: no ROM of knee    Subjective   General  Chart Reviewed: Yes  Patient assessed for rehabilitation services?: Yes  Family / Caregiver Present: Yes (family friend)      Social/Functional History  Social/Functional History  Lives With: Spouse  Type of Home: House  Home Layout: One level  Home Access: Stairs to enter with rails  Entrance Stairs - Goals  Long Term Goal 1: Upgrade as tolerated       Tx initiated:  Training in precautions, sit to stand techniques, ADL strategied, therapeutic activities.  (30 mins)          Jackie Lucero OT/ENOC  Electronically signed by Jackie Lucero OT/L on 6/10/2022 at 2:24 PM.

## 2022-06-10 NOTE — CARE COORDINATION
Patient Contact Information:    [address] [place] [address] Drive  [phone] (home)   Telephone Information:   Mobile [phone]     Above information verified? [x]   Yes  []   No      Emergency Contacts:    Extended Emergency Contact Information  Primary Emergency Contact: Kendra Mittal  Address: [address] Marry Campbell of [address] [place] [address] Phone: [phone]  Relation: Spouse      Have you been vaccinated for COVID-19 (SARS-CoV-2)? [x]   Yes  []   No                   If so, when? Which :         []   Pfizer-BioNTech  [x]   Moderna  []   Tasha Mole  []   Other:         Pharmacy:    [place] [address]  Phone: [phone] Fax: [phone]          Patient resides at home with spouse. She states that she would like referrals to be sent to [facility] [facility] and [facility]. Referrals sent.  Awaiting acceptance/denial.    [facility]  Phone: [phone]  Fax: [phone] E-fax for admissions    [facility]  Electronically signed by Miya Tomlinson RN, BSN on 6/10/2022 at 11:03 AM

## 2022-06-10 NOTE — PROGRESS NOTES
Physical Therapy  Facility/Department: [facility] SURG SERVICES  Physical Therapy Initial Assessment    Name: tC Das  : 1940  MRN: [id number]  Date of Service: 6/10/2022    Discharge Recommendations:  [address] with PT,Subacute/Skilled Nursing Facility,Therapy recommended at discharge          Patient Diagnosis(es): The encounter diagnosis was Other closed fracture of distal end of right femur, initial encounter ([facility] [address] Past Medical History:  has a past medical history of Acid reflux, Arthritis, Diabetes mellitus ([facility] [address] Hx of blood clots, Hyperlipidemia, Hypertension, and Thyroid disease. Past Surgical History:  has a past surgical history that includes Total knee arthroplasty (Bilateral, ); Lithotripsy; Cholecystectomy; Tubal ligation; Total Thyroidectomy; eye surgery (Bilateral); Total hip arthroplasty (Right, 2019); and Femur fracture surgery (Right, 2022).     Assessment   Body Structures, Functions, Activity Limitations Requiring Skilled Therapeutic Intervention: Decreased functional mobility   Assessment: Patient will benefit from continuing skilled physical therapy to improve mobility  Treatment Diagnosis: Decline in mobility  Therapy Prognosis: Good  Decision Making: Low Complexity  Requires PT Follow-Up: Yes  Activity Tolerance  Activity Tolerance: Patient limited by fatigue     Plan   Plan  Plan: 2 times a day 7 days a week  Plan weeks: 2  Current Treatment Recommendations: Strengthening,Balance training,Functional mobility training,Transfer training,Gait training  Safety Devices  Type of Devices: Call light within reach,Chair alarm in place,Gait belt,Left in chair,Nurse notified     Restrictions  Restrictions/Precautions  Restrictions/Precautions: Weight Bearing  Required Braces or Orthoses?: Yes  Lower Extremity Weight Bearing Restrictions  Right Lower Extremity Weight Bearing: Non Weight Bearing  Left Lower Extremity Weight Bearing: Weight Bearing As Tolerated  Required Braces or Orthoses  Right Lower Extremity Brace: Knee Immobilizer  RLE Brace Type: Dr. Clive Pabon clarified knee ROM to No knee motion on right     Subjective   Pain: No C/O pain at this time  General  Chart Reviewed: Yes  Patient assessed for rehabilitation services?: Yes  Diagnosis: Right femur fracture  Follows Commands: Within Functional Limits  Subjective  Subjective: States that she was working in her garden when she sustained her injury         Social/Functional History     Vision/Hearing       Cognition   Orientation  Overall Orientation Status: Within Functional Limits  Cognition  Overall Cognitive Status: WFL     Objective   Heart Rate: 60  Heart Rate Source: Monitor  BP: 115/65  BP Location: Right lower arm  BP Method: Automatic  MAP (Calculated): 81.67  Resp: 18  SpO2: 96 %  O2 Device: None (Room air)              PROM RLE (degrees)  RLE General PROM: Not tested  PROM LLE (degrees)  LLE PROM: WFL  Strength RLE  Comment: Hip 2-/5  Strength LLE  Strength LLE: WFL           Bed mobility  Supine to Sit: Minimal assistance  Sit to Supine: Minimal assistance  Scooting: Minimal assistance  Transfers  Sit to Stand: Minimal Assistance  Stand to sit: Minimal Assistance  Bed to Chair: Contact guard assistance  Ambulation  WB Status: NWB RLE - Compliant  Ambulation  Device: Rolling Walker  Other Apparatus: Knee Immobilizer  Quality of Gait: Limited to transfer at this time due to NWB RLE     Balance  Posture: Good  Sitting - Static: Good  Sitting - Dynamic: Good  Standing - Static: Fair  Standing - Dynamic: Fair;-    Goals  Short Term Goals  Time Frame for Short term goals: 2 weeks  Short term goal 1: Independent with bed mobility  Short term goal 2: Independent with transfers  Short term goal 3: Ambulate 30 feet with assistive device and minimum assist of 1       Education  Patient Education  Education Provided: Plan of Care;Transfer Training; Fall Prevention Strategies;Precautions  Education Method: Verbal;Demonstration  Barriers to Learning: None  Education Outcome: Demonstrated understanding;Verbalized understanding      Therapy Time   Individual Concurrent Group Co-treatment   Time In           Time Out           Minutes                   Moisés Cohen PT         Electronically signed by Moisés Cohen PT on 6/10/2022 at 8:08 AM

## 2022-06-10 NOTE — PROGRESS NOTES
Orthopedic Surgery Progress Note    Stephania Giron  6/10/2022      Subjective:     Systemic or Specific Complaints:  The patient is sitting up in bed. She reports minimal postoperative discomfort.       Objective:     Patient Vitals for the past 24 hrs:   BP Temp Temp src Pulse Resp SpO2   06/10/22 0726 115/65 (!) 96 °F (35.6 °C) Temporal 60 18 96 %   06/10/22 0510 -- -- -- -- 18 --   06/10/22 0503 101/60 97 °F (36.1 °C) Temporal 58 16 93 %   06/09/22 2321 (!) 98/55 (!) 96.7 °F (35.9 °C) Temporal 67 16 90 %   06/09/22 2143 125/70 (!) 96.6 °F (35.9 °C) Temporal 82 16 92 %   06/09/22 1948 125/72 (!) 96.2 °F (35.7 °C) Temporal 82 16 90 %   06/09/22 1838 129/69 (!) 95.9 °F (35.5 °C) Temporal 90 16 93 %   06/09/22 1750 138/71 (!) 96.3 °F (35.7 °C) Temporal 90 20 93 %   06/09/22 1712 128/68 (!) 96.4 °F (35.8 °C) Temporal 89 16 90 %   06/09/22 1623 (!) 141/60 97.2 °F (36.2 °C) Temporal 93 16 92 %   06/09/22 1605 115/65 -- -- 91 16 96 %   06/09/22 1600 97/78 98.3 °F (36.8 °C) Temporal 90 16 93 %   06/09/22 1557 -- -- -- 93 -- --   06/09/22 1555 105/80 -- -- 88 16 91 %   06/09/22 1550 113/60 -- -- 89 14 93 %   06/09/22 1545 (!) 115/55 -- -- 89 17 93 %   06/09/22 1540 (!) 99/47 -- -- 87 17 92 %   06/09/22 1535 99/69 -- -- 87 19 91 %   06/09/22 1530 (!) 98/45 -- -- 87 16 91 %   06/09/22 1525 100/86 -- -- 88 20 94 %   06/09/22 1520 114/76 -- -- 88 16 95 %   06/09/22 1515 -- -- -- 92 21 93 %   06/09/22 1513 (!) 156/67 99.1 °F (37.3 °C) Temporal 98 22 93 %   06/09/22 1510 (!) 156/67 -- -- 100 12 92 %       right lower  General: alert, appears stated age and cooperative   Wound: clean, dry, intact             Dressing: clean, dry, and intact   Extremity: Distal NVI           DVT Exam: No evidence of DVT seen on physical exam.                   Data Review:  Recent Labs     06/09/22  0409 06/10/22  0405   HGB 11.3* 10.5*     Recent Labs     06/10/22  0405   *   K 5.6*  5.6*   CREATININE 0.8       Assessment:     POD# 1 s/p right distal femur ORIF    Plan:      1:  DVT prophylaxis, ICE, elevate  2:  Pain control  3:  Physical therapy/Occupational therapy  4:  Anticipate discharge once discharge arrangements confirmed and if pain well controlled  5:  Non-weight bearing RLE w/ NO ROM through right knee       Electronically signed by Aidan Martinez MD on 6/10/2022 at 2:30 PM

## 2022-06-10 NOTE — PROGRESS NOTES
Daily Progress Note    Date:6/10/2022  Patient: Jamal Ch  : 1940  ZLE:[id number]  CODE:Full Code No additional code details  Malcolm StevensBRADEN - MICH    Admit Date: 2022  5:03 PM   LOS: 2 days       Subjective:    No acute events. Patient out of bed, resting comfortably in chair. Pain now much better controlled. No other complaints. Denies any headaches, vision changes, chest pain, shortness of breath, lightheadedness/dizziness, abdominal pain or dysuria.         Review of Systems    14 point review of systems completed and is negative except as otherwise noted      Objective:      Vital signs in last 24 hours:  Patient Vitals for the past 24 hrs:   BP Temp Temp src Pulse Resp SpO2   06/10/22 0726 115/65 (!) 96 °F (35.6 °C) Temporal 60 18 96 %   06/10/22 0510 -- -- -- -- 18 --   06/10/22 0503 101/60 97 °F (36.1 °C) Temporal 58 16 93 %   22 2321 (!) 98/55 (!) 96.7 °F (35.9 °C) Temporal 67 16 90 %   22 2143 125/70 (!) 96.6 °F (35.9 °C) Temporal 82 16 92 %   22 1948 125/72 (!) 96.2 °F (35.7 °C) Temporal 82 16 90 %   22 1838 129/69 (!) 95.9 °F (35.5 °C) Temporal 90 16 93 %   22 1750 138/71 (!) 96.3 °F (35.7 °C) Temporal 90 20 93 %   22 1712 128/68 (!) 96.4 °F (35.8 °C) Temporal 89 16 90 %   22 1623 (!) 141/60 97.2 °F (36.2 °C) Temporal 93 16 92 %   22 1605 115/65 -- -- 91 16 96 %     Physical exam    General: No acute distress  Psych: Calm and cooperative  HEENT: NC/AT, no scleral icterus  Cardiovascular: Normal rate, pulses equal bilaterally  Respiratory: CTAB, normal work of breathing  Abdomen: Soft, nontender, bowel sounds present  Extremities: No clubbing, cyanosis or edema  Neurologic: Alert, follows commands, normal speech  Skin: Warm and dry      Lab Review   Recent Results (from the past 24 hour(s))   POCT Glucose    Collection Time: 22  5:11 PM   Result Value Ref Range    POC Glucose 354 (H) 70 - 99 mg/dl    Performed on [employer] POCT Glucose    Collection Time: 06/09/22  8:23 PM   Result Value Ref Range    POC Glucose 490 (H) 70 - 99 mg/dl    Performed on [employer]    Basic Metabolic Panel w/ Reflex to MG    Collection Time: 06/10/22  4:05 AM   Result Value Ref Range    Sodium 132 (L) 136 - 145 mmol/L    Potassium reflex Magnesium 5.6 (H) 3.5 - 5.0 mmol/L    Chloride 98 98 - 111 mmol/L    CO2 24 22 - 29 mmol/L    Anion Gap 10 7 - 19 mmol/L    Glucose 348 (H) 74 - 109 mg/dL    BUN 15 8 - 23 mg/dL    CREATININE 0.8 0.5 - 0.9 mg/dL    GFR Non-African American >60 >60    GFR African American >59 >59    Calcium 8.2 (L) 8.8 - 10.2 mg/dL   CBC    Collection Time: 06/10/22  4:05 AM   Result Value Ref Range    WBC 8.5 4.8 - 10.8 K/uL    RBC 3.68 (L) 4.20 - 5.40 M/uL    Hemoglobin 10.5 (L) 12.0 - 16.0 g/dL    Hematocrit 33.9 (L) 37.0 - 47.0 %    MCV 92.1 81.0 - 99.0 fL    MCH 28.5 27.0 - 31.0 pg    MCHC 31.0 (L) 33.0 - 37.0 g/dL    RDW 13.3 11.5 - 14.5 %    Platelets 239 916 - 868 K/uL    MPV 11.2 9.4 - 12.3 fL   Basic Metabolic Panel    Collection Time: 06/10/22  4:05 AM   Result Value Ref Range    Potassium 5.6 (H) 3.5 - 5.0 mmol/L   POCT Glucose    Collection Time: 06/10/22  7:23 AM   Result Value Ref Range    POC Glucose 302 (H) 70 - 99 mg/dl    Performed on [employer]    POCT Glucose    Collection Time: 06/10/22 11:16 AM   Result Value Ref Range    POC Glucose 310 (H) 70 - 99 mg/dl    Performed on [employer]        I/O last 3 completed shifts: In: 3615.8 [P.O.:300; I.V.:3315.8]  Out: 9281 [Urine:1750; Blood:30]  I/O this shift:  In: 9409 [P.O.:840;  I.V.:500]  Out: 600 [Urine:600]      Current Facility-Administered Medications:     insulin glargine (LANTUS) injection vial 5 Units, 5 Units, SubCUTAneous, Nightly, Reese Solano MD, 5 Units at 06/09/22 2135    iron sucrose (VENOFER) injection 200 mg, 200 mg, IntraVENous, Q24H, Reese Solano MD    sodium chloride flush 0.9 % injection 5-40 mL, 5-40 mL, IntraVENous, 2 times per day, MD Blanca Kirk sodium chloride flush 0.9 % injection 5-40 mL, 5-40 mL, IntraVENous, PRN, Nieves Gmoez MD    0.9 % sodium chloride infusion, , IntraVENous, PRN, Nieves Gomze MD    ondansetron (ZOFRAN-ODT) disintegrating tablet 4 mg, 4 mg, Oral, Q8H PRN **OR** ondansetron (ZOFRAN) injection 4 mg, 4 mg, IntraVENous, Q6H PRN, Nieves Gomez MD    0.9 % sodium chloride infusion, , IntraVENous, Continuous, Nieves Gomez MD, Last Rate: 75 mL/hr at 06/09/22 1711, New Bag at 06/09/22 1711    sennosides-docusate sodium (SENOKOT-S) 8.6-50 MG tablet 1 tablet, 1 tablet, Oral, BID, Nieves Gomez MD, 1 tablet at 06/10/22 0744    magnesium hydroxide (MILK OF MAGNESIA) 400 MG/5ML suspension 30 mL, 30 mL, Oral, Daily PRN, Nieves Gomez MD    polyethylene glycol [facility]) packet 17 g, 17 g, Oral, Daily PRN, Nieves Gomez MD    morphine (PF) injection 2 mg, 2 mg, IntraVENous, Q2H PRN **OR** morphine sulfate (PF) injection 4 mg, 4 mg, IntraVENous, Q2H PRN, Nieves Gomez MD    oxyCODONE (ROXICODONE) immediate release tablet 5 mg, 5 mg, Oral, Q4H PRN, 5 mg at 06/10/22 0440 **OR** oxyCODONE (ROXICODONE) immediate release tablet 10 mg, 10 mg, Oral, Q4H PRN, Nieves Gomez MD, 10 mg at 06/10/22 1519    acetaminophen (TYLENOL) tablet 650 mg, 650 mg, Oral, Q4H PRN, Nieves Gomez MD    apixaban [facility] Bon[facility]) tablet 2.5 mg, 2.5 mg, Oral, BID, Nieves Gomez MD, 2.5 mg at 06/10/22 0744    sodium chloride flush 0.9 % injection 5-40 mL, 5-40 mL, IntraVENous, 2 times per day, Nieves Gomez MD    sodium chloride flush 0.9 % injection 5-40 mL, 5-40 mL, IntraVENous, PRN, Nieves Gomez MD    0.9 % sodium chloride infusion, , IntraVENous, PRN, Nieves Gomez MD  Mindy Me  [DISCONTINUED] acetaminophen (TYLENOL) tablet 650 mg, 650 mg, Oral, Q6H PRN **OR** acetaminophen (TYLENOL) suppository 650 mg, 650 mg, Rectal, Q6H PRN, Nieves Gomez MD    insulin lispro (HUMALOG) injection vial 0-6 Units, 0-6 Units, SubCUTAneous, TID WC, Nieves Gomez MD, 4 Units at 06/10/22 1214    insulin lispro (HUMALOG) injection vial 0-3 Units, 0-3 Units, SubCUTAneous, Nightly, Bolivar Lind MD, 3 Units at 06/09/22 2135    glucose chewable tablet 16 g, 4 tablet, Oral, PRN, Bolivar Lind MD    dextrose bolus 10% 125 mL, 125 mL, IntraVENous, PRN **OR** dextrose bolus 10% 250 mL, 250 mL, IntraVENous, PRN, Bolivar Lind MD    glucagon (rDNA) injection 1 mg, 1 mg, IntraMUSCular, PRN, Bolivar Lind MD    dextrose 5 % solution, 100 mL/hr, IntraVENous, PRN, Bolivar Lind MD    levothyroxine (SYNTHROID) tablet 200 mcg, 200 mcg, Oral, Daily, Bolivar Lind MD, 200 mcg at 06/10/22 0539    losartan (COZAAR) tablet 100 mg, 100 mg, Oral, Daily, Bolivar Lind MD, 100 mg at 06/10/22 0744    PARoxetine (PAXIL) tablet 20 mg, 20 mg, Oral, QAM, Bolivar Lind MD, 20 mg at 06/10/22 0743    atorvastatin (LIPITOR) tablet 40 mg, 40 mg, Oral, Daily, Bolivar Lind MD, 40 mg at 06/10/22 8901        Assessment/plan  Principal Problem:    Open fracture of femur, type I or II, with routine healing  Active Problems:    Iron deficiency anemia    Other fracture of right femur, initial encounter for closed fracture (HCC)    Vitamin D deficiency    Vitamin B 12 deficiency    Hypothyroidism    Hyperkalemia    Type 2 diabetes mellitus with hyperglycemia (HCC)    Essential hypertension    Gastroesophageal reflux disease without esophagitis  Resolved Problems:    * No resolved hospital problems. *      Summary: 57-year-old female with hypertension, diabetes, presented as transfer from outside facility [facility] ED) due to right femur fracture after she had a fall while twisting her right knee with x-ray showing fracture with mild displacement and some angulation of distal femur. Patient was taken to the OR by orthopedics on 6/9 and underwent ORIF. Did well postoperatively. Did have some mild hyperkalemia so her losartan was held and [facility] Iron deficiency noted, given supplementation.   Had uncontrolled hyperglycemia so adjusted insulin regimen. Working on SNF placement for rehab.       Femur fracture  S/p ORIF  Pain control   PT/OT    Mild hyperkalemia  - Hold losartan, dose of Lokelma x1, continue to follow    Iron deficiency  - Add iron supplementation    Hypertension  - Stop losartan due to hyperkalemia, monitor BP  -- Continue HCTZ    DM with uncontrolled hyperglycemia  - Okay to resume Janumet  - POC glucose monitoring   -- Increase basal insulin, add prandial insulin along with corrective SSI    Hypothyroidism  - Synthroid    DVT prophylaxis: Low-dose Eliquis per orthopedics      Jh Peacock MD 6/10/2022 4:02 PM

## 2022-06-10 NOTE — CARE COORDINATION
Patient Information    Patient Name   Merline Cinnamon ([id number]) Legal Sex   Female    1940   Room Bed   3734 [id number]       Patient Demographics    Address   [address] [place] [address] Phone   [phone] (Home) *Preferred*   [phone] [facility]     [address] Number    N        Basic Information    Date Of Birth   1940 Gender Identity   Female Race   White (non-) Ethnic Group   Non- / Non  Preferred Language   English Preferred Written Language   English     Admission Information      Current Information    Attending Provider Admitting Provider Admission Type Admission Status   MD Filipe Hernández MD Elective Confirmed Admission          Admission Date/Time Discharge Date Hospital Service Auth/Cert Status     05:03 PM  Surgery Incomplete   [address] Room/Bed    [facility] SURG SERVICES [id number]                Admission    Complaint   right femur fx     PCP and Center    Primary Care Provider [address], APRN - NP [address]     Payor Information             PRIMARY INSURANCE   Payor: MEDICARE Plan: MEDICARE PART A AND B   Group Number:   Insurance Type: INDEMNITY   Subscriber Name: Nahomy Kapadia : 1940   Subscriber ID: [id number]       Pat. Rel. to Subscriber: Self       SECONDARY INSURANCE   Payor: Fiona Estrada Plan: [employer] SENIOR MEDICARE [place]*   Group Number:   Insurance Type: INDEMNITY   Subscriber Name: Nahomy Kapadia : 1940   Subscriber ID: [id number]       Pat.  Rel. to Subscriber: SELF              Documents on File     Status Date Received Description   Documents for the Patient   Photo ID Received () 07/05/19 3-16-20   Insurance Card Received () 19    ACP-Advance Directive Not Received     ACP-[employer] Not Received     Financial Assistance Program Applications Not Received     Recurring Hospital Consent and NPP Received () 19    Insurance Card Received () 19    Photo ID Received () 19    Advance Directive Assessment Received 19    (OLD) [facility] Physician Consent and NPP Not Received     Physician Office Consent for Treatment Not Received     Financial Responsibility Not Received     MyChart Adult Proxy Access Not Received     MyChart Child Proxy Access Not Received     ACO Consent for the Release of  Confidential Alcohol or Drug Treatment Information Not Received     ACO Declining to [address] [facility] Not Received     Physician Communication Release NPP Not Received     Physician Consent and NPP Signed () 19    Progress Notes Received 10/25/19 10/23/19 Progress note from Vishal FELICIANO   Other Results Received 10/25/19 Referral and records from [address] [place] [address]   Lab Result Scan Received 19 Labs [address] 2019   Lab Result Scan Received 19 Factor V Leiden 2019   Radiology Report Received 19 mammogram 2019   Radiology Report Received 19 CT Chest 2019   Radiology Report Received 19 US VFS lower ext 19   Radiology Report Received 19 CT abd pelvis 2019   Lab Result Scan Received 19 Labs [address] 2019   Radiology Report Received 19 Mammo [facility] 19   Lab Result Scan Received 19 Factor V [facility] 19   Recurring Hospital Consent and NPP Received () 19    Operative Record Received 19 EGD [facility] 19   Miscellaneous Received 10/60/07 DRIVERS LICENSE   Lab Result Scan Received 20 CBC, HGB A1C @ SALE   Documents for the Encounter   Medicare Important Message Received 22    Medicare Outpatient Observation Notice Not Received     [facility] - Medicare Second Copy Given to Pt      Stress ECG Waveforms Received 22 EKG 12-LEAD on the ekg interface     Medical Problems  Comment          Hospital Problem List  Date Reviewed: 6/9/2022     ICD-10-CM Priority Class Noted POA   Open fracture of femur, type I or II, with routine healing S72.90XE   6/8/2022 Unknown   Overview Signed 6/9/2022  6:41 AM by Rosie Noble MD    Added automatically from request for surgery [id number]      Other fracture of right femur, initial encounter for closed fracture ([facility] [address] S72.8X1A Medium  6/8/2022 Yes   Vitamin D deficiency E55.9 Medium  6/8/2022 Yes   Vitamin B 12 deficiency E53.8 Medium  6/8/2022 Yes   Hypothyroidism E03.9 Medium  6/8/2022 Yes   Hyperkalemia E87.5 Medium  6/8/2022 Yes   Iron deficiency anemia D50.9   7/26/2019 Yes   Type 2 diabetes mellitus with hyperglycemia (HCC) E11.65   7/26/2019 Yes   Essential hypertension (Chronic) I10   7/26/2019 Yes   Gastroesophageal reflux disease without esophagitis (Chronic) K21.9   7/26/2019 Yes     Non-Hospital Problem List  Date Reviewed: 6/9/2022     ICD-10-CM Priority Class Noted   Primary osteoarthritis of right hip M16.11   7/25/2019

## 2022-06-10 NOTE — PROGRESS NOTES
Physical Therapy  [address]  [id number]     06/10/22 1446   Subjective   Subjective Patient up in chair and states she is ready to go back to bed. Bed Mobility   Sit to Supine Minimal assistance   Transfers   Sit to Stand Minimal Assistance   Stand to sit Minimal Assistance   Bed to Chair Contact guard assistance   Ambulation   Device Rolling Walker   Other Apparatus Knee Immobilizer   Quality of Gait Limited to transfer at this time due to NWB RLE   Other Activities   Comment Patient transferred chair to bed. Patient returned to supine, positioned for comfort with all needs in reach. Short Term Goals   Time Frame for Short term goals 2 weeks   Short term goal 1 Independent with bed mobility   Short term goal 2 Independent with transfers   Short term goal 3 Ambulate 30 feet with assistive device and minimum assist of 1   Activity Tolerance   Activity Tolerance Patient tolerated treatment well   Safety Devices   Type of Devices Bed alarm in place;Call light within reach; Left in bed   Electronically signed by Jasper Holter, PTA on 6/10/2022 at 2:48 PM

## 2022-06-10 NOTE — PROGRESS NOTES
PHYSICAL THERAPY  Daily Treatment Note    DATE:  6/10/2022  NAME:  Mayda Garcia  :  1940  (82 y.o.,female)  MRN:  [id number]      Subjective  General  Chart Reviewed: Yes  Patient assessed for rehabilitation services?: Yes  Diagnosis: Right femur fracture  Follows Commands: Within Functional Limits  Subjective  Subjective: States that she was working in her garden when she sustained her injury          HOME LIVING:       RESTRICTIONS/PRECAUTIONS:    Restrictions/Precautions  Restrictions/Precautions: Weight Bearing  Required Braces or Orthoses?: Yes    OVERALL  ORIENTATION STATUS:  Overall Orientation Status: Within Functional Limits    STRENGTH  Strength RLE  Comment: Hip 2-/5  Strength LLE  Strength LLE: WFL    ROM   PROM RLE (degrees)  RLE General PROM: Not tested  PROM LLE (degrees)  LLE PROM: WFL     BALANCE  Balance  Posture: Good  Sitting - Static: Good  Sitting - Dynamic: Good  Standing - Static: Fair  Standing - Dynamic: Fair,-    BED MOBILITY  Bed Mobility  Scooting: Minimal assistance    TRANSFERS  Transfers  Sit to Stand: Minimal Assistance  Stand to sit: Minimal Assistance  Bed to Chair: Contact guard assistance    AMBULATION  Device: Rolling Walker          STAIRS             COMMENTS:  Returned to chair at patient request  Call light within reach  Chair alarm activated  Patient instructed to request assistance before getting up  Nursing updated        Electronically signed by Markos Del Cid PT on 6/10/2022 at 8:08 AM

## 2022-06-11 LAB
ANION GAP SERPL CALCULATED.3IONS-SCNC: 15 MMOL/L (ref 7–19)
BUN BLDV-MCNC: 21 MG/DL (ref 8–23)
CALCIUM SERPL-MCNC: 8.5 MG/DL (ref 8.8–10.2)
CHLORIDE BLD-SCNC: 101 MMOL/L (ref 98–111)
CO2: 20 MMOL/L (ref 22–29)
CREAT SERPL-MCNC: 0.8 MG/DL (ref 0.5–0.9)
GFR AFRICAN AMERICAN: >59
GFR NON-AFRICAN AMERICAN: >60
GLUCOSE BLD-MCNC: 244 MG/DL (ref 74–109)
GLUCOSE BLD-MCNC: 246 MG/DL (ref 70–99)
GLUCOSE BLD-MCNC: 255 MG/DL (ref 70–99)
GLUCOSE BLD-MCNC: 261 MG/DL (ref 70–99)
GLUCOSE BLD-MCNC: 275 MG/DL (ref 70–99)
HCT VFR BLD CALC: 34.6 % (ref 37–47)
HEMOGLOBIN: 10.3 G/DL (ref 12–16)
MCH RBC QN AUTO: 28.8 PG (ref 27–31)
MCHC RBC AUTO-ENTMCNC: 29.8 G/DL (ref 33–37)
MCV RBC AUTO: 96.6 FL (ref 81–99)
PDW BLD-RTO: 13.6 % (ref 11.5–14.5)
PERFORMED ON: ABNORMAL
PLATELET # BLD: 148 K/UL (ref 130–400)
PMV BLD AUTO: 11.3 FL (ref 9.4–12.3)
POTASSIUM REFLEX MAGNESIUM: 4.6 MMOL/L (ref 3.5–5)
RBC # BLD: 3.58 M/UL (ref 4.2–5.4)
SODIUM BLD-SCNC: 136 MMOL/L (ref 136–145)
WBC # BLD: 9.8 K/UL (ref 4.8–10.8)

## 2022-06-11 PROCEDURE — 6370000000 HC RX 637 (ALT 250 FOR IP): Performed by: ORTHOPAEDIC SURGERY

## 2022-06-11 PROCEDURE — 85027 COMPLETE CBC AUTOMATED: CPT

## 2022-06-11 PROCEDURE — 1210000000 HC MED SURG R&B

## 2022-06-11 PROCEDURE — 6360000002 HC RX W HCPCS: Performed by: HOSPITALIST

## 2022-06-11 PROCEDURE — 6370000000 HC RX 637 (ALT 250 FOR IP): Performed by: HOSPITALIST

## 2022-06-11 PROCEDURE — 6370000000 HC RX 637 (ALT 250 FOR IP): Performed by: STUDENT IN AN ORGANIZED HEALTH CARE EDUCATION/TRAINING PROGRAM

## 2022-06-11 PROCEDURE — 97530 THERAPEUTIC ACTIVITIES: CPT

## 2022-06-11 PROCEDURE — 2580000003 HC RX 258: Performed by: ORTHOPAEDIC SURGERY

## 2022-06-11 PROCEDURE — 82947 ASSAY GLUCOSE BLOOD QUANT: CPT

## 2022-06-11 PROCEDURE — 80048 BASIC METABOLIC PNL TOTAL CA: CPT

## 2022-06-11 PROCEDURE — 36415 COLL VENOUS BLD VENIPUNCTURE: CPT

## 2022-06-11 PROCEDURE — 2580000003 HC RX 258: Performed by: HOSPITALIST

## 2022-06-11 RX ORDER — INSULIN GLARGINE 100 [IU]/ML
10 INJECTION, SOLUTION SUBCUTANEOUS 2 TIMES DAILY
Status: DISCONTINUED | OUTPATIENT
Start: 2022-06-11 | End: 2022-06-12

## 2022-06-11 RX ADMIN — OXYCODONE 10 MG: 5 TABLET ORAL at 21:25

## 2022-06-11 RX ADMIN — FERROUS SULFATE TAB 325 MG (65 MG ELEMENTAL FE) 325 MG: 325 (65 FE) TAB at 09:05

## 2022-06-11 RX ADMIN — PAROXETINE HYDROCHLORIDE 20 MG: 20 TABLET, FILM COATED ORAL at 09:05

## 2022-06-11 RX ADMIN — OXYCODONE 10 MG: 5 TABLET ORAL at 09:04

## 2022-06-11 RX ADMIN — SODIUM CHLORIDE, PRESERVATIVE FREE 10 ML: 5 INJECTION INTRAVENOUS at 21:06

## 2022-06-11 RX ADMIN — OXYCODONE 10 MG: 5 TABLET ORAL at 17:28

## 2022-06-11 RX ADMIN — INSULIN LISPRO 4 UNITS: 100 INJECTION, SOLUTION INTRAVENOUS; SUBCUTANEOUS at 17:52

## 2022-06-11 RX ADMIN — DOCUSATE SODIUM 50 MG AND SENNOSIDES 8.6 MG 1 TABLET: 8.6; 5 TABLET, FILM COATED ORAL at 09:05

## 2022-06-11 RX ADMIN — SODIUM CHLORIDE, PRESERVATIVE FREE 10 ML: 5 INJECTION INTRAVENOUS at 21:27

## 2022-06-11 RX ADMIN — INSULIN GLARGINE 10 UNITS: 100 INJECTION, SOLUTION SUBCUTANEOUS at 13:36

## 2022-06-11 RX ADMIN — INSULIN LISPRO 4 UNITS: 100 INJECTION, SOLUTION INTRAVENOUS; SUBCUTANEOUS at 13:35

## 2022-06-11 RX ADMIN — DOCUSATE SODIUM 50 MG AND SENNOSIDES 8.6 MG 1 TABLET: 8.6; 5 TABLET, FILM COATED ORAL at 21:07

## 2022-06-11 RX ADMIN — POLYETHYLENE GLYCOL 3350 17 G: 17 POWDER, FOR SOLUTION ORAL at 09:04

## 2022-06-11 RX ADMIN — HYDROCHLOROTHIAZIDE 12.5 MG: 12.5 CAPSULE ORAL at 09:05

## 2022-06-11 RX ADMIN — ATORVASTATIN CALCIUM 40 MG: 40 TABLET, FILM COATED ORAL at 09:04

## 2022-06-11 RX ADMIN — MAGNESIUM HYDROXIDE 30 ML: 400 SUSPENSION ORAL at 13:31

## 2022-06-11 RX ADMIN — OXYCODONE 10 MG: 5 TABLET ORAL at 13:31

## 2022-06-11 RX ADMIN — APIXABAN 2.5 MG: 2.5 TABLET, FILM COATED ORAL at 09:05

## 2022-06-11 RX ADMIN — IRON SUCROSE 300 MG: 20 INJECTION, SOLUTION INTRAVENOUS at 14:48

## 2022-06-11 RX ADMIN — INSULIN LISPRO 3 UNITS: 100 INJECTION, SOLUTION INTRAVENOUS; SUBCUTANEOUS at 21:06

## 2022-06-11 RX ADMIN — INSULIN LISPRO 6 UNITS: 100 INJECTION, SOLUTION INTRAVENOUS; SUBCUTANEOUS at 08:57

## 2022-06-11 RX ADMIN — APIXABAN 2.5 MG: 2.5 TABLET, FILM COATED ORAL at 21:07

## 2022-06-11 RX ADMIN — INSULIN LISPRO 4 UNITS: 100 INJECTION, SOLUTION INTRAVENOUS; SUBCUTANEOUS at 17:51

## 2022-06-11 RX ADMIN — METFORMIN HYDROCHLORIDE 1000 MG: 500 TABLET ORAL at 17:27

## 2022-06-11 RX ADMIN — INSULIN LISPRO 4 UNITS: 100 INJECTION, SOLUTION INTRAVENOUS; SUBCUTANEOUS at 08:57

## 2022-06-11 RX ADMIN — INSULIN LISPRO 6 UNITS: 100 INJECTION, SOLUTION INTRAVENOUS; SUBCUTANEOUS at 13:34

## 2022-06-11 RX ADMIN — LEVOTHYROXINE SODIUM 200 MCG: 100 TABLET ORAL at 09:33

## 2022-06-11 RX ADMIN — INSULIN GLARGINE 10 UNITS: 100 INJECTION, SOLUTION SUBCUTANEOUS at 21:06

## 2022-06-11 RX ADMIN — METFORMIN HYDROCHLORIDE 1000 MG: 500 TABLET ORAL at 13:32

## 2022-06-11 ASSESSMENT — PAIN DESCRIPTION - LOCATION
LOCATION: HIP;KNEE
LOCATION: HIP;KNEE

## 2022-06-11 ASSESSMENT — PAIN DESCRIPTION - FREQUENCY
FREQUENCY: INTERMITTENT
FREQUENCY: INTERMITTENT

## 2022-06-11 ASSESSMENT — PAIN DESCRIPTION - DESCRIPTORS
DESCRIPTORS: ACHING
DESCRIPTORS: ACHING;DISCOMFORT

## 2022-06-11 ASSESSMENT — PAIN SCALES - GENERAL
PAINLEVEL_OUTOF10: 7
PAINLEVEL_OUTOF10: 8

## 2022-06-11 ASSESSMENT — PAIN DESCRIPTION - ONSET
ONSET: SUDDEN
ONSET: ON-GOING

## 2022-06-11 ASSESSMENT — PAIN SCALES - WONG BAKER: WONGBAKER_NUMERICALRESPONSE: 6

## 2022-06-11 ASSESSMENT — PAIN DESCRIPTION - ORIENTATION
ORIENTATION: RIGHT
ORIENTATION: RIGHT

## 2022-06-11 ASSESSMENT — PAIN DESCRIPTION - PAIN TYPE: TYPE: SURGICAL PAIN

## 2022-06-11 NOTE — PROGRESS NOTES
06/11/22 1600   Subjective   Subjective I am comfortable in the chair . (Patient declines BTB at this time. NSG.  ok with BTB)   Physical Therapy    Electronically signed by Jamaica Tian PTA on 6/11/2022 at 4:19 PM

## 2022-06-11 NOTE — PROGRESS NOTES
Assessment & Plan     Hypertension, unspecified type  Given persistent elevated blood pressures we will have her start hydrochlorothiazide and follow-up with blood pressures in 2 weeks to see how things are going.  Will attain labs today.  - hydrochlorothiazide (MICROZIDE) 12.5 MG capsule  Dispense: 90 capsule; Refill: 3  - Lipid panel reflex to direct LDL Fasting  - Basic metabolic panel  (Ca, Cl, CO2, Creat, Gluc, K, Na, BUN)  - Basic metabolic panel  (Ca, Cl, CO2, Creat, Gluc, K, Na, BUN)  - Lipid panel reflex to direct LDL Fasting    Social anxiety disorder   did start this for social speaking has done well with it.  She would like to try it.  I did discuss making sure she is used to the hydrochlorothiazide before using this.  - propranolol (INDERAL) 10 MG tablet  Dispense: 60 tablet; Refill: 3           Return if symptoms worsen or fail to improve, for Routine preventive.    Binh Pineda MD  [facility]    Frieda Knowles is a 37 year old who presents for the following health issues     HPI     Chief Complaint   Patient presents with     Hypertension     blood pressure running high again, has been on medication in the past but got to stop after losing weight     Notes she has struggled with elevated blood pressure during previous pregnancies.  She has checked it on and off over the last year and it consistently is elevated.  Elevated today as well.  Thinks it is time to start something.  She has been on amlodipine in the past and it gave her headaches.  Did okay with methyldopa previously but has not been on any other blood pressure medications.  Does note  takes propranolol for social anxiety in public speaking which he is done well with.  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  She otherwise been healthy and has had a significant 40 pound weight loss through exercise and diet program she does.  Does  06/11/22 1000   Subjective   Subjective Patient in bed linens being changed agrees to go to chair   Pain Assessment   Pain Assessment Shook-Baker FACES   Shook-Baker Pain Rating 6   Patient's Stated Pain Goal 0 - No pain   Pain Location Hip;Knee   Pain Orientation Right   Pain Descriptors Aching   Functional Pain Assessment Prevents or interferes some active activities and ADLs   Pain Frequency Intermittent   Pain Onset Sudden  (With movement)   Non-Pharmaceutical Pain Intervention(s) Rest   Response to Pain Intervention Patient satisfied   Vitals   O2 Device None (Room air)   Bed Mobility Training   Bed Mobility Training Yes   Rolling Maximum assistance   Supine to Sit Minimum assistance   Scooting Stand-by assistance   Balance   Sitting Without support   Transfer Training   Transfer Training Yes   Sit to Stand Moderate assistance   Stand to Sit Minimum assistance   Stand Pivot Transfers Minimum assistance   Bed to Chair Minimum assistance   Gait Training   Gait Training No   Weight Bearing   Weight Bearing Technique   (NON WB R  LE with brace)   Patient Education   Education Given To Patient   Education Provided Transfer Training   Education Method Verbal;Demonstration   Assessment   Activity Tolerance Patient tolerated treatment well   PT Equipment Recommendations   Other Patient in chair with call Earline stone present   PT Plan of Care   Saturday X   Physical Therapy    Electronically signed by David Almonte PTA on 6/11/2022 at 11:07 AM note she may have some underlying anxiety and a couple times the summer where she felt her heart beating funny at night after drinking alcohol.  Otherwise no other respiratory symptoms chest pain or shortness of breath.  No swelling.  No hair or skin changes.    Review of Systems   Constitutional, HEENT, cardiovascular, pulmonary, gi and gu systems are negative, except as otherwise noted.      Objective    BP (!) 146/104   Pulse 82   Temp 98.3  F (36.8  C) (Temporal)   Resp 16   Ht 1.829 m (6')   Wt 82.1 kg (181 lb)   SpO2 98%   BMI 24.55 kg/m    Body mass index is 24.55 kg/m .  Physical Exam   GENERAL: healthy, alert and no distress  EYES: Eyes grossly normal to inspection, PERRL and conjunctivae and sclerae normal  HENT: clearing possible  NECK: no adenopathy, no asymmetry, masses, or scars and thyroid normal to palpation  RESP: lungs clear to auscultation - no rales, rhonchi or wheezes  CV: regular rate and rhythm, normal S1 S2, no S3 or S4, no murmur, click or rub, no peripheral edema and peripheral pulses strong  ABDOMEN: soft, nontender, no hepatosplenomegaly, no masses and bowel sounds normal  MS: no gross musculoskeletal defects noted, no edema  SKIN: no suspicious lesions or rashes  NEURO: Normal strength and tone, mentation intact and speech normal  PSYCH: mentation appears normal, affect normal/bright

## 2022-06-11 NOTE — PROGRESS NOTES
Daily Progress Note    Date:2022  Patient: Araseli Lawson  : 1940  SDQ:[id number]  CODE:Full Code No additional code details  Margaretlivia Qasim, APRN - NP    Admit Date: 2022  5:03 PM   LOS: 3 days       Subjective:    Patient is feeling better today. Postop pain is under control. Denies any chest pain or shortness of breath.       Review of Systems    14 point review of systems completed and is negative except as otherwise noted      Objective:      Vital signs in last 24 hours:  Patient Vitals for the past 24 hrs:   BP Temp Temp src Pulse Resp SpO2   22 0853 (!) 143/62 98.3 °F (36.8 °C) Temporal 81 18 94 %   22 0454 131/64 97.2 °F (36.2 °C) Temporal 73 17 94 %   06/10/22 2045 (!) 104/50 99 °F (37.2 °C) Oral -- -- --   06/10/22 2006 (!) 94/42 98.2 °F (36.8 °C) Temporal 67 16 100 %   06/10/22 1602 132/62 (!) 96.6 °F (35.9 °C) Temporal 70 18 95 %     Physical exam    General: No acute distress  Psych: Calm and cooperative  HEENT: NC/AT, no scleral icterus  Cardiovascular: Normal rate, pulses equal bilaterally  Respiratory: CTAB, normal work of breathing  Abdomen: Soft, nontender, bowel sounds present  Extremities: No clubbing, cyanosis or edema  Neurologic: Alert, follows commands, normal speech  Skin: Warm and dry      Lab Review   Recent Results (from the past 24 hour(s))   POCT Glucose    Collection Time: 06/10/22  4:02 PM   Result Value Ref Range    POC Glucose 285 (H) 70 - 99 mg/dl    Performed on AccuChek    POCT Glucose    Collection Time: 06/10/22  8:19 PM   Result Value Ref Range    POC Glucose 287 (H) 70 - 99 mg/dl    Performed on AccuChek    Basic Metabolic Panel w/ Reflex to MG    Collection Time: 22  3:23 AM   Result Value Ref Range    Sodium 136 136 - 145 mmol/L    Potassium reflex Magnesium 4.6 3.5 - 5.0 mmol/L    Chloride 101 98 - 111 mmol/L    CO2 20 (L) 22 - 29 mmol/L    Anion Gap 15 7 - 19 mmol/L    Glucose 244 (H) 74 - 109 mg/dL    BUN 21 8 - 23 mg/dL CREATININE 0.8 0.5 - 0.9 mg/dL    GFR Non-African American >60 >60    GFR African American >59 >59    Calcium 8.5 (L) 8.8 - 10.2 mg/dL   CBC    Collection Time: 06/11/22  3:23 AM   Result Value Ref Range    WBC 9.8 4.8 - 10.8 K/uL    RBC 3.58 (L) 4.20 - 5.40 M/uL    Hemoglobin 10.3 (L) 12.0 - 16.0 g/dL    Hematocrit 34.6 (L) 37.0 - 47.0 %    MCV 96.6 81.0 - 99.0 fL    MCH 28.8 27.0 - 31.0 pg    MCHC 29.8 (L) 33.0 - 37.0 g/dL    RDW 13.6 11.5 - 14.5 %    Platelets 903 656 - 062 K/uL    MPV 11.3 9.4 - 12.3 fL   POCT Glucose    Collection Time: 06/11/22  7:45 AM   Result Value Ref Range    POC Glucose 255 (H) 70 - 99 mg/dl    Performed on AccuChek    POCT Glucose    Collection Time: 06/11/22 11:13 AM   Result Value Ref Range    POC Glucose 275 (H) 70 - 99 mg/dl    Performed on AccuChek        I/O last 3 completed shifts:   In: 8295 [P.O.:1080; I.V.:500]  Out: 2050 [Urine:2050]  I/O this shift:  In: 1260 [P.O.:1260]  Out: 1050 [Urine:1050]      Current Facility-Administered Medications:     insulin glargine (LANTUS) injection vial 10 Units, 10 Units, SubCUTAneous, BID, Osmin Obrien MD, 10 Units at 06/11/22 1336    iron sucrose (VENOFER) 300 mg in sodium chloride 0.9 % 250 mL IVPB, 300 mg, IntraVENous, Q24H, Osmin Obrien MD, Last Rate: 176.7 mL/hr at 06/11/22 1448, 300 mg at 06/11/22 1448    hydroCHLOROthiazide (MICROZIDE) capsule 12.5 mg, 12.5 mg, Oral, QAM, Yudy Griffin MD, 12.5 mg at 06/11/22 0905    insulin lispro (HUMALOG) injection vial 0-12 Units, 0-12 Units, SubCUTAneous, TID , Yudy Griffin MD, 6 Units at 06/11/22 1334    insulin lispro (HUMALOG) injection vial 0-6 Units, 0-6 Units, SubCUTAneous, Nightly, Yudy Griffin MD, 3 Units at 06/10/22 2106    insulin lispro (HUMALOG) injection vial 4 Units, 4 Units, SubCUTAneous, TID , Yudy Griffin MD, 4 Units at 06/11/22 1335    [Held by provider] ferrous sulfate (IRON 325) tablet 325 mg, 325 mg, Oral, Every Other Day, Yudy Griffin MD, 325 mg at 06/11/22 0905    metFORMIN (GLUCOPHAGE) tablet 1,000 mg, 1,000 mg, Oral, BID WC, Pati Zavala MD, 1,000 mg at 06/11/22 1332    sodium chloride flush 0.9 % injection 5-40 mL, 5-40 mL, IntraVENous, 2 times per day, Krishna Hilario MD    sodium chloride flush 0.9 % injection 5-40 mL, 5-40 mL, IntraVENous, PRN, Krishna Hilario MD    0.9 % sodium chloride infusion, , IntraVENous, PRN, Krishna Hilario MD    ondansetron (ZOFRAN-ODT) disintegrating tablet 4 mg, 4 mg, Oral, Q8H PRN **OR** ondansetron (ZOFRAN) injection 4 mg, 4 mg, IntraVENous, Q6H PRN, Krishna Hilario MD    0.9 % sodium chloride infusion, , IntraVENous, Continuous, Krishna Hilario MD, Last Rate: 75 mL/hr at 06/09/22 1711, New Bag at 06/09/22 1711    sennosides-docusate sodium (SENOKOT-S) 8.6-50 MG tablet 1 tablet, 1 tablet, Oral, BID, Krishna Hilario MD, 1 tablet at 06/11/22 0905    magnesium hydroxide (MILK OF MAGNESIA) 400 MG/5ML suspension 30 mL, 30 mL, Oral, Daily PRN, Krishna Hilario MD, 30 mL at 06/11/22 1331    polyethylene glycol (GLYCOLAX) packet 17 g, 17 g, Oral, Daily PRN, Krishna Hilario MD, 17 g at 06/11/22 0904    morphine (PF) injection 2 mg, 2 mg, IntraVENous, Q2H PRN **OR** morphine sulfate (PF) injection 4 mg, 4 mg, IntraVENous, Q2H PRN, Krishna Hilario MD    oxyCODONE (ROXICODONE) immediate release tablet 5 mg, 5 mg, Oral, Q4H PRN, 5 mg at 06/10/22 0440 **OR** oxyCODONE (ROXICODONE) immediate release tablet 10 mg, 10 mg, Oral, Q4H PRN, Krishna Hilario MD, 10 mg at 06/11/22 1331    acetaminophen (TYLENOL) tablet 650 mg, 650 mg, Oral, Q4H PRN, Krishna Hilario MD, 650 mg at 06/10/22 2105    apixaban (ELIQUIS) tablet 2.5 mg, 2.5 mg, Oral, BID, Krishna Hilario MD, 2.5 mg at 06/11/22 0905    sodium chloride flush 0.9 % injection 5-40 mL, 5-40 mL, IntraVENous, 2 times per day, Krishna Hilario MD    sodium chloride flush 0.9 % injection 5-40 mL, 5-40 mL, IntraVENous, PRN, Krishna Hilario MD    0.9 % sodium chloride infusion, , IntraVENous, PRN, MD Kenton Kincaid [DISCONTINUED] acetaminophen (TYLENOL) tablet 650 mg, 650 mg, Oral, Q6H PRN **OR** acetaminophen (TYLENOL) suppository 650 mg, 650 mg, Rectal, Q6H PRN, Denver Fields, MD    glucose chewable tablet 16 g, 4 tablet, Oral, PRN, Denver Fields, MD    dextrose bolus 10% 125 mL, 125 mL, IntraVENous, PRN **OR** dextrose bolus 10% 250 mL, 250 mL, IntraVENous, PRN, Denver Fields, MD    glucagon (rDNA) injection 1 mg, 1 mg, IntraMUSCular, PRN, Denver Fields, MD    dextrose 5 % solution, 100 mL/hr, IntraVENous, PRN, Denver Fields, MD    levothyroxine (SYNTHROID) tablet 200 mcg, 200 mcg, Oral, Daily, Denver Fields, MD, 200 mcg at 06/11/22 0933    [Held by provider] losartan (COZAAR) tablet 100 mg, 100 mg, Oral, Daily, Denver Fields, MD, 100 mg at 06/10/22 0744    PARoxetine (PAXIL) tablet 20 mg, 20 mg, Oral, QAM, Denver Fields, MD, 20 mg at 06/11/22 0905    atorvastatin (LIPITOR) tablet 40 mg, 40 mg, Oral, Daily, Denver Fields, MD, 40 mg at 06/11/22 7652        Assessment/plan  Principal Problem:    Open fracture of femur, type I or II, with routine healing  Active Problems:    Other fracture of right femur, initial encounter for closed fracture (HCC)    Vitamin D deficiency    Vitamin B 12 deficiency    Hypothyroidism    Hyperkalemia    Iron deficiency anemia    Type 2 diabetes mellitus with hyperglycemia ([facility])    Essential hypertension    Gastroesophageal reflux disease without esophagitis  Resolved Problems:    * No resolved hospital problems. *      Summary: 80-year-old female with hypertension, diabetes, presented as transfer from outside facility [facility] ED) due to right femur fracture after she had a fall while twisting her right knee with x-ray showing fracture with mild displacement and some angulation of distal femur. Patient was taken to the OR by orthopedics on 6/9 and underwent ORIF. Did well postoperatively. Did have some mild hyperkalemia so her losartan was held and given [facility].   Iron deficiency noted, given supplementation. Had uncontrolled hyperglycemia so adjusted insulin regimen. Working on SNF placement for rehab. Femur fracture  S/p ORIF  Pain control   PT/OT    Mild hyperkalemia  - Hold losartan, dose of Lokelma x1, continue to follow  -- Potassium level is now within normal limits    Iron deficiency  - Patient started on IV Venofer for 3 days  -- Would require oral iron supplementation upon discharge    Hypertension  - Stop losartan due to hyperkalemia, monitor BP  -- Continue HCTZ    DM with uncontrolled hyperglycemia  - Okay to resume Janumet  - POC glucose monitoring   --Patient's blood sugar levels are still 250 and higher  -- Increase basal insulin, add prandial insulin along with corrective SSI  -- Lantus increased to 10 units subcu twice daily  -- Patient need good diabetic complaint to assist in fracture healing    Hypothyroidism  - Continue with Synthroid      Chronic medical issues . .. Continue with home meds. Monitor patient closely while admitted. Advised very close f/u with patient's PCP as an outpatient to address chronic medical issues. Repeat labs in a.m. Electrolyte replacement as per protocol. Patient will be monitored very closely on the floor. Further recommendations as per the hospital course. DC planning : Skilled nursing facility once arrangements are made by Case management    Patient  is on DVT prophylaxis  Current medications reviewed  Lab work reviewed  Radiology/Chest x-ray films reviewed  Treatment recommendations from suspecialities reviewed, appreciated and agreed with  Discussed with the nurse and addressed all questions/concerns  Discussed with Patient and/or Family at the bedside in detail . .. they understand and agree with the management plan.     DVT prophylaxis: Low-dose Eliquis per orthopedics      Osmin Obrien MD 6/11/2022 3:43 PM

## 2022-06-12 LAB
ANION GAP SERPL CALCULATED.3IONS-SCNC: 9 MMOL/L (ref 7–19)
BUN BLDV-MCNC: 17 MG/DL (ref 8–23)
CALCIUM SERPL-MCNC: 8.3 MG/DL (ref 8.8–10.2)
CHLORIDE BLD-SCNC: 99 MMOL/L (ref 98–111)
CO2: 24 MMOL/L (ref 22–29)
CREAT SERPL-MCNC: 0.7 MG/DL (ref 0.5–0.9)
GFR AFRICAN AMERICAN: >59
GFR NON-AFRICAN AMERICAN: >60
GLUCOSE BLD-MCNC: 175 MG/DL (ref 70–99)
GLUCOSE BLD-MCNC: 220 MG/DL (ref 70–99)
GLUCOSE BLD-MCNC: 232 MG/DL (ref 70–99)
GLUCOSE BLD-MCNC: 238 MG/DL (ref 74–109)
GLUCOSE BLD-MCNC: 300 MG/DL (ref 70–99)
HCT VFR BLD CALC: 31.8 % (ref 37–47)
HEMOGLOBIN: 10 G/DL (ref 12–16)
MCH RBC QN AUTO: 28.7 PG (ref 27–31)
MCHC RBC AUTO-ENTMCNC: 31.4 G/DL (ref 33–37)
MCV RBC AUTO: 91.1 FL (ref 81–99)
PDW BLD-RTO: 13.7 % (ref 11.5–14.5)
PERFORMED ON: ABNORMAL
PLATELET # BLD: 173 K/UL (ref 130–400)
PMV BLD AUTO: 10.4 FL (ref 9.4–12.3)
POTASSIUM REFLEX MAGNESIUM: 4.4 MMOL/L (ref 3.5–5)
RBC # BLD: 3.49 M/UL (ref 4.2–5.4)
SODIUM BLD-SCNC: 132 MMOL/L (ref 136–145)
WBC # BLD: 9.1 K/UL (ref 4.8–10.8)

## 2022-06-12 PROCEDURE — 85027 COMPLETE CBC AUTOMATED: CPT

## 2022-06-12 PROCEDURE — 1210000000 HC MED SURG R&B

## 2022-06-12 PROCEDURE — 80048 BASIC METABOLIC PNL TOTAL CA: CPT

## 2022-06-12 PROCEDURE — 2580000003 HC RX 258: Performed by: ORTHOPAEDIC SURGERY

## 2022-06-12 PROCEDURE — 82947 ASSAY GLUCOSE BLOOD QUANT: CPT

## 2022-06-12 PROCEDURE — 6360000002 HC RX W HCPCS: Performed by: HOSPITALIST

## 2022-06-12 PROCEDURE — 6370000000 HC RX 637 (ALT 250 FOR IP): Performed by: HOSPITALIST

## 2022-06-12 PROCEDURE — 6370000000 HC RX 637 (ALT 250 FOR IP): Performed by: ORTHOPAEDIC SURGERY

## 2022-06-12 PROCEDURE — 97530 THERAPEUTIC ACTIVITIES: CPT

## 2022-06-12 PROCEDURE — 6370000000 HC RX 637 (ALT 250 FOR IP): Performed by: STUDENT IN AN ORGANIZED HEALTH CARE EDUCATION/TRAINING PROGRAM

## 2022-06-12 PROCEDURE — 2580000003 HC RX 258: Performed by: HOSPITALIST

## 2022-06-12 PROCEDURE — 36415 COLL VENOUS BLD VENIPUNCTURE: CPT

## 2022-06-12 RX ORDER — INSULIN GLARGINE 100 [IU]/ML
12 INJECTION, SOLUTION SUBCUTANEOUS 2 TIMES DAILY
Status: DISCONTINUED | OUTPATIENT
Start: 2022-06-12 | End: 2022-06-12

## 2022-06-12 RX ORDER — INSULIN GLARGINE 100 [IU]/ML
12 INJECTION, SOLUTION SUBCUTANEOUS 2 TIMES DAILY
Status: DISCONTINUED | OUTPATIENT
Start: 2022-06-12 | End: 2022-06-14 | Stop reason: HOSPADM

## 2022-06-12 RX ORDER — FOLIC ACID 1 MG/1
1 TABLET ORAL DAILY
Status: DISCONTINUED | OUTPATIENT
Start: 2022-06-12 | End: 2022-06-14 | Stop reason: HOSPADM

## 2022-06-12 RX ORDER — INSULIN GLARGINE 100 [IU]/ML
15 INJECTION, SOLUTION SUBCUTANEOUS 2 TIMES DAILY
Status: DISCONTINUED | OUTPATIENT
Start: 2022-06-12 | End: 2022-06-12

## 2022-06-12 RX ADMIN — IRON SUCROSE 300 MG: 20 INJECTION, SOLUTION INTRAVENOUS at 15:25

## 2022-06-12 RX ADMIN — OXYCODONE 10 MG: 5 TABLET ORAL at 11:41

## 2022-06-12 RX ADMIN — DOCUSATE SODIUM 50 MG AND SENNOSIDES 8.6 MG 1 TABLET: 8.6; 5 TABLET, FILM COATED ORAL at 09:42

## 2022-06-12 RX ADMIN — ATORVASTATIN CALCIUM 40 MG: 40 TABLET, FILM COATED ORAL at 09:42

## 2022-06-12 RX ADMIN — LEVOTHYROXINE SODIUM 200 MCG: 100 TABLET ORAL at 12:36

## 2022-06-12 RX ADMIN — ACETAMINOPHEN 650 MG: 325 TABLET ORAL at 20:21

## 2022-06-12 RX ADMIN — INSULIN LISPRO 4 UNITS: 100 INJECTION, SOLUTION INTRAVENOUS; SUBCUTANEOUS at 09:43

## 2022-06-12 RX ADMIN — INSULIN LISPRO 4 UNITS: 100 INJECTION, SOLUTION INTRAVENOUS; SUBCUTANEOUS at 17:55

## 2022-06-12 RX ADMIN — INSULIN LISPRO 2 UNITS: 100 INJECTION, SOLUTION INTRAVENOUS; SUBCUTANEOUS at 20:56

## 2022-06-12 RX ADMIN — FOLIC ACID 1 MG: 1 TABLET ORAL at 17:40

## 2022-06-12 RX ADMIN — HYDROCHLOROTHIAZIDE 12.5 MG: 12.5 CAPSULE ORAL at 09:42

## 2022-06-12 RX ADMIN — APIXABAN 2.5 MG: 2.5 TABLET, FILM COATED ORAL at 09:42

## 2022-06-12 RX ADMIN — OXYCODONE 10 MG: 5 TABLET ORAL at 17:09

## 2022-06-12 RX ADMIN — OXYCODONE 10 MG: 5 TABLET ORAL at 20:21

## 2022-06-12 RX ADMIN — OXYCODONE 10 MG: 5 TABLET ORAL at 01:33

## 2022-06-12 RX ADMIN — INSULIN GLARGINE 12 UNITS: 100 INJECTION, SOLUTION SUBCUTANEOUS at 20:54

## 2022-06-12 RX ADMIN — INSULIN LISPRO 4 UNITS: 100 INJECTION, SOLUTION INTRAVENOUS; SUBCUTANEOUS at 09:49

## 2022-06-12 RX ADMIN — APIXABAN 2.5 MG: 2.5 TABLET, FILM COATED ORAL at 20:21

## 2022-06-12 RX ADMIN — METFORMIN HYDROCHLORIDE 1000 MG: 500 TABLET ORAL at 09:42

## 2022-06-12 RX ADMIN — METFORMIN HYDROCHLORIDE 1000 MG: 500 TABLET ORAL at 17:41

## 2022-06-12 RX ADMIN — INSULIN GLARGINE 12 UNITS: 100 INJECTION, SOLUTION SUBCUTANEOUS at 09:43

## 2022-06-12 RX ADMIN — INSULIN LISPRO 2 UNITS: 100 INJECTION, SOLUTION INTRAVENOUS; SUBCUTANEOUS at 17:54

## 2022-06-12 RX ADMIN — PAROXETINE HYDROCHLORIDE 20 MG: 20 TABLET, FILM COATED ORAL at 09:43

## 2022-06-12 RX ADMIN — SODIUM CHLORIDE, PRESERVATIVE FREE 10 ML: 5 INJECTION INTRAVENOUS at 12:40

## 2022-06-12 RX ADMIN — INSULIN LISPRO 4 UNITS: 100 INJECTION, SOLUTION INTRAVENOUS; SUBCUTANEOUS at 12:36

## 2022-06-12 RX ADMIN — INSULIN LISPRO 8 UNITS: 100 INJECTION, SOLUTION INTRAVENOUS; SUBCUTANEOUS at 12:39

## 2022-06-12 ASSESSMENT — PAIN SCALES - GENERAL
PAINLEVEL_OUTOF10: 6
PAINLEVEL_OUTOF10: 6
PAINLEVEL_OUTOF10: 8

## 2022-06-12 ASSESSMENT — PAIN DESCRIPTION - ORIENTATION
ORIENTATION: RIGHT
ORIENTATION: RIGHT

## 2022-06-12 ASSESSMENT — PAIN DESCRIPTION - LOCATION
LOCATION: HIP;KNEE
LOCATION: LEG

## 2022-06-12 ASSESSMENT — PAIN DESCRIPTION - FREQUENCY: FREQUENCY: INTERMITTENT

## 2022-06-12 ASSESSMENT — PAIN DESCRIPTION - PAIN TYPE: TYPE: SURGICAL PAIN

## 2022-06-12 ASSESSMENT — PAIN DESCRIPTION - DESCRIPTORS
DESCRIPTORS: ACHING;DISCOMFORT
DESCRIPTORS: DISCOMFORT;GNAWING;NAGGING

## 2022-06-12 ASSESSMENT — PAIN DESCRIPTION - ONSET: ONSET: ON-GOING

## 2022-06-12 NOTE — PROGRESS NOTES
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 Pain Assessment   Pain Assessment 0-10   Pain Level 8   Patient's Stated Pain Goal 0 - No pain   Vitals   O2 Device None (Room air)   Bed Mobility Training   Bed Mobility Training Yes   Overall Level of Assistance Minimum assistance  (Assist with R LE.)   Interventions Verbal cues   Supine to Sit Minimum assistance   Scooting Stand-by assistance   Balance   Sitting Without support   Transfer Training   Transfer Training Yes   Interventions Demonstration;Manual cues; Safety awareness training; Tactile cues; Verbal cues; Visual cues   Sit to Stand Maximum assistance  (On second attempt from bed)   Stand to Sit Minimum assistance   Stand Pivot Transfers Minimum assistance   Bed to Chair Minimum assistance  (Cues for NWB)   Gait Training   Gait Training Yes   Right Side Weight Bearing Non-weight bearing  (With Bace)   Left Side Weight Bearing As tolerated  (Used shoe cover to facilitate  Pivot)   Gait   Distance (ft) 1 Feet  (Pivot to chair)   Assistive Device Walker, rolling   Weight Bearing   Weight Bearing Technique No   Patient Education   Education Given To Patient   Education Provided Role of Therapy;Plan of Care;Transfer Training   Education Method Demonstration;Verbal   Education Outcome Demonstrated understanding   Other Specialty Interventions   Other Treatments/Modalities Patient required extra time. When TR. urinated on floor. Floor cleaned clean socks for patient.    Assessment   Activity Tolerance Patient tolerated treatment well   PT Equipment Recommendations   Other Patient in chair with call light; personal alarm attached   PT Plan of Care   Sunday X   Physical Therapy    Electronically signed by Stewart Alexandra PTA on 6/12/2022 at 12:29 PM

## 2022-06-12 NOTE — PROGRESS NOTES
Daily Progress Note    Date:2022  Patient: Dino Black  : 1940  ZGQ:[id number]  CODE:Full Code No additional code details  BRADEN Rees NP    Admit Date: 2022  5:03 PM   LOS: 4 days       Subjective:    Patient lying in bed during my morning rounds. He is feeling little better. Pain is under control.     Review of Systems    14 point review of systems completed and is negative except as otherwise noted      Objective:      Vital signs in last 24 hours:  Patient Vitals for the past 24 hrs:   BP Temp Temp src Pulse Resp SpO2   22 0732 (!) 115/58 (!) 96.3 °F (35.7 °C) Temporal 75 20 93 %   22 1959 (!) 126/56 97.9 °F (36.6 °C) Temporal 79 17 93 %   22 1709 129/66 97.5 °F (36.4 °C) Temporal 69 18 95 %     Physical exam    General: No acute distress  Psych: Calm and cooperative  HEENT: NC/AT, no scleral icterus  Cardiovascular: Normal rate, pulses equal bilaterally  Respiratory: CTAB, normal work of breathing  Abdomen: Soft, nontender, bowel sounds present  Extremities: No clubbing, cyanosis or edema  Neurologic: Alert, follows commands, normal speech  Skin: Warm and dry      Lab Review   Recent Results (from the past 24 hour(s))   POCT Glucose    Collection Time: 22  4:42 PM   Result Value Ref Range    POC Glucose 246 (H) 70 - 99 mg/dl    Performed on [employer]    POCT Glucose    Collection Time: 22  8:05 PM   Result Value Ref Range    POC Glucose 261 (H) 70 - 99 mg/dl    Performed on [employer]    Basic Metabolic Panel w/ Reflex to MG    Collection Time: 22  3:15 AM   Result Value Ref Range    Sodium 132 (L) 136 - 145 mmol/L    Potassium reflex Magnesium 4.4 3.5 - 5.0 mmol/L    Chloride 99 98 - 111 mmol/L    CO2 24 22 - 29 mmol/L    Anion Gap 9 7 - 19 mmol/L    Glucose 238 (H) 74 - 109 mg/dL    BUN 17 8 - 23 mg/dL    CREATININE 0.7 0.5 - 0.9 mg/dL    GFR Non-African American >60 >60    GFR African American >59 >59    Calcium 8.3 (L) 8.8 - 10.2 mg/dL   CBC Collection Time: 06/12/22  3:15 AM   Result Value Ref Range    WBC 9.1 4.8 - 10.8 K/uL    RBC 3.49 (L) 4.20 - 5.40 M/uL    Hemoglobin 10.0 (L) 12.0 - 16.0 g/dL    Hematocrit 31.8 (L) 37.0 - 47.0 %    MCV 91.1 81.0 - 99.0 fL    MCH 28.7 27.0 - 31.0 pg    MCHC 31.4 (L) 33.0 - 37.0 g/dL    RDW 13.7 11.5 - 14.5 %    Platelets 393 903 - 856 K/uL    MPV 10.4 9.4 - 12.3 fL   POCT Glucose    Collection Time: 06/12/22  7:29 AM   Result Value Ref Range    POC Glucose 220 (H) 70 - 99 mg/dl    Performed on [employer]Chek    POCT Glucose    Collection Time: 06/12/22 11:38 AM   Result Value Ref Range    POC Glucose 300 (H) 70 - 99 mg/dl    Performed on [employer]uChek        I/O last 3 completed shifts:   In: 2000 [P.O.:2000]  Out: 2100 [Urine:2100]  I/O this shift:  In: 240 [P.O.:240]  Out: 750 [Urine:750]      Current Facility-Administered Medications:     insulin glargine (LANTUS) injection vial 15 Units, 15 Units, SubCUTAneous, BID, Osmin Obrien MD    iron sucrose (VENOFER) 300 mg in sodium chloride 0.9 % 250 mL IVPB, 300 mg, IntraVENous, Q24H, Osmin Obrien MD, Stopped at 06/11/22 1618    hydroCHLOROthiazide (MICROZIDE) capsule 12.5 mg, 12.5 mg, Oral, QAM, Randall Workman MD, 12.5 mg at 06/12/22 0942    insulin lispro (HUMALOG) injection vial 0-12 Units, 0-12 Units, SubCUTAneous, TID , Randall Workman MD, 8 Units at 06/12/22 1239    insulin lispro (HUMALOG) injection vial 0-6 Units, 0-6 Units, SubCUTAneous, Nightly, Randall Workman MD, 3 Units at 06/11/22 2106    insulin lispro (HUMALOG) injection vial 4 Units, 4 Units, SubCUTAneous, TID Randall ZULETA MD, 4 Units at 06/12/22 1236    [Held by provider] ferrous sulfate (IRON 325) tablet 325 mg, 325 mg, Oral, Every Other Day, Randall Workman MD, 325 mg at 06/11/22 0905    metFORMIN (GLUCOPHAGE) tablet 1,000 mg, 1,000 mg, Oral, BID Randall ZULETA MD, 1,000 mg at 06/12/22 0942    sodium chloride flush 0.9 % injection 5-40 mL, 5-40 mL, IntraVENous, 2 times per day, Lorie Peterson MD, 10 mL at 06/12/22 1240    sodium chloride flush 0.9 % injection 5-40 mL, 5-40 mL, IntraVENous, PRN, Lorie Peterson MD    0.9 % sodium chloride infusion, , IntraVENous, PRN, Lorie Peterson MD    ondansetron (ZOFRAN-ODT) disintegrating tablet 4 mg, 4 mg, Oral, Q8H PRN **OR** ondansetron (ZOFRAN) injection 4 mg, 4 mg, IntraVENous, Q6H PRN, Lorie Peterson MD    0.9 % sodium chloride infusion, , IntraVENous, Continuous, Lorie Peterson MD, Last Rate: 75 mL/hr at 06/09/22 1711, New Bag at 06/09/22 1711    sennosides-docusate sodium (SENOKOT-S) 8.6-50 MG tablet 1 tablet, 1 tablet, Oral, BID, Lorie Peterson MD, 1 tablet at 06/12/22 0942    magnesium hydroxide (MILK OF MAGNESIA) 400 MG/5ML suspension 30 mL, 30 mL, Oral, Daily PRN, Lorie Peterson MD, 30 mL at 06/11/22 1331    polyethylene glycol (GLYCOLAX) packet 17 g, 17 g, Oral, Daily PRN, Lorie Peterson MD, 17 g at 06/11/22 0904    morphine (PF) injection 2 mg, 2 mg, IntraVENous, Q2H PRN **OR** morphine sulfate (PF) injection 4 mg, 4 mg, IntraVENous, Q2H PRN, Lorie Peterson MD    oxyCODONE (ROXICODONE) immediate release tablet 5 mg, 5 mg, Oral, Q4H PRN, 5 mg at 06/10/22 0440 **OR** oxyCODONE (ROXICODONE) immediate release tablet 10 mg, 10 mg, Oral, Q4H PRN, Lorie Peterson MD, 10 mg at 06/12/22 1141    acetaminophen (TYLENOL) tablet 650 mg, 650 mg, Oral, Q4H PRN, Lorie Peterson MD, 650 mg at 06/10/22 2105    apixaban (ELIQUIS) tablet 2.5 mg, 2.5 mg, Oral, BID, Lorie Peterson MD, 2.5 mg at 06/12/22 0942    sodium chloride flush 0.9 % injection 5-40 mL, 5-40 mL, IntraVENous, 2 times per day, Lorie Peterson MD    sodium chloride flush 0.9 % injection 5-40 mL, 5-40 mL, IntraVENous, PRN, Lorie Peterson MD    0.9 % sodium chloride infusion, , IntraVENous, PRN, Lorie Peterson MD  Blase Liming  [DISCONTINUED] acetaminophen (TYLENOL) tablet 650 mg, 650 mg, Oral, Q6H PRN **OR** acetaminophen (TYLENOL) suppository 650 mg, 650 mg, Rectal, Q6H PRN, Lorie Peterson MD    glucose chewable tablet 16 g, 4 tablet, Oral, PRN, Denver Fields, MD    dextrose bolus 10% 125 mL, 125 mL, IntraVENous, PRN **OR** dextrose bolus 10% 250 mL, 250 mL, IntraVENous, PRN, Denver Fields, MD    glucagon (rDNA) injection 1 mg, 1 mg, IntraMUSCular, PRN, Denver Fields, MD    dextrose 5 % solution, 100 mL/hr, IntraVENous, PRN, Denver Fields, MD    levothyroxine (SYNTHROID) tablet 200 mcg, 200 mcg, Oral, Daily, Denver Fields, MD, 200 mcg at 06/12/22 1236    [Held by provider] losartan (COZAAR) tablet 100 mg, 100 mg, Oral, Daily, Denver Fields, MD, 100 mg at 06/10/22 0744    PARoxetine (PAXIL) tablet 20 mg, 20 mg, Oral, QAM, Denver Fields, MD, 20 mg at 06/12/22 0943    atorvastatin (LIPITOR) tablet 40 mg, 40 mg, Oral, Daily, Denver Fields, MD, 40 mg at 06/12/22 0473        Assessment/plan  Principal Problem:    Open fracture of femur, type I or II, with routine healing  Active Problems:    Other fracture of right femur, initial encounter for closed fracture (HCC)    Vitamin D deficiency    Vitamin B 12 deficiency    Hypothyroidism    Hyperkalemia    Iron deficiency anemia    Type 2 diabetes mellitus with hyperglycemia ([facility])    Essential hypertension    Gastroesophageal reflux disease without esophagitis  Resolved Problems:    * No resolved hospital problems. *      Summary: 68-year-old female with hypertension, diabetes, presented as transfer from outside facility [facility] ED) due to right femur fracture after she had a fall while twisting her right knee with x-ray showing fracture with mild displacement and some angulation of distal femur. Patient was taken to the OR by orthopedics on 6/9 and underwent ORIF. Did well postoperatively. Did have some mild hyperkalemia so her losartan was held and [facility]. Iron deficiency noted, given supplementation. Had uncontrolled hyperglycemia so adjusted insulin regimen. Working on SNF placement for rehab.       Femur fracture  S/p ORIF  Pain control   PT/OT    Mild hyperkalemia  - Hold losartan, dose of Lokelma x1, continue to follow  -- Potassium level is now within normal limits    Iron deficiency  - Patient started on IV Venofer for 3 days  -- Would require oral iron supplementation upon discharge    Borderline folic acid levels  -- Patient Started on folic acid 1 mg oral daily    Hypertension  - Stop losartan due to hyperkalemia, monitor BP  -- Continue HCTZ    DM with uncontrolled hyperglycemia  - Okay to resume Janumet  - POC glucose monitoring   --Patient's blood sugar levels are still 250 and higher  -- Increase basal insulin, add prandial insulin along with corrective SSI  -- Lantus increased from 10-12 units subcu twice daily  -- Patient need good diabetic control to assist in fracture healing    Hypothyroidism  - Continue with Synthroid      Chronic medical issues . .. Continue with home meds. Monitor patient closely while admitted. Advised very close f/u with patient's PCP as an outpatient to address chronic medical issues. Repeat labs in a.m. Electrolyte replacement as per protocol. Patient will be monitored very closely on the floor. Further recommendations as per the hospital course. DC planning : Skilled nursing facility once arrangements are made by Case management    Patient  is on DVT prophylaxis  Current medications reviewed  Lab work reviewed  Radiology/Chest x-ray films reviewed  Treatment recommendations from suspecialities reviewed, appreciated and agreed with  Discussed with the nurse and addressed all questions/concerns  Discussed with Patient and/or Family at the bedside in detail . .. they understand and agree with the management plan.     DVT prophylaxis: Low-dose Eliquis per orthopedics      Osmin Obrien MD 6/12/2022 2:43 PM

## 2022-06-13 VITALS
BODY MASS INDEX: 34.27 KG/M2 | WEIGHT: 231.4 LBS | HEART RATE: 76 BPM | RESPIRATION RATE: 16 BRPM | OXYGEN SATURATION: 97 % | HEIGHT: 69 IN | SYSTOLIC BLOOD PRESSURE: 95 MMHG | DIASTOLIC BLOOD PRESSURE: 81 MMHG | TEMPERATURE: 96.4 F

## 2022-06-13 PROBLEM — M19.90 ARTHRITIS: Status: ACTIVE | Noted: 2022-06-13

## 2022-06-13 PROBLEM — E87.5 HYPERKALEMIA: Status: RESOLVED | Noted: 2022-06-08 | Resolved: 2022-06-13

## 2022-06-13 PROBLEM — Z79.4 TYPE 2 DIABETES MELLITUS WITH HYPERGLYCEMIA, WITH LONG-TERM CURRENT USE OF INSULIN (HCC): Status: ACTIVE | Noted: 2022-06-13

## 2022-06-13 PROBLEM — E11.9 DIABETES MELLITUS (HCC): Status: ACTIVE | Noted: 2022-06-13

## 2022-06-13 PROBLEM — E78.5 HYPERLIPIDEMIA: Status: ACTIVE | Noted: 2022-06-13

## 2022-06-13 PROBLEM — Z86.718 HX OF BLOOD CLOTS: Status: ACTIVE | Noted: 2018-11-01

## 2022-06-13 PROBLEM — E11.65 TYPE 2 DIABETES MELLITUS WITH HYPERGLYCEMIA, WITH LONG-TERM CURRENT USE OF INSULIN (HCC): Status: ACTIVE | Noted: 2022-06-13

## 2022-06-13 LAB
ANION GAP SERPL CALCULATED.3IONS-SCNC: 10 MMOL/L (ref 7–19)
BUN BLDV-MCNC: 17 MG/DL (ref 8–23)
CALCIUM SERPL-MCNC: 8.7 MG/DL (ref 8.8–10.2)
CHLORIDE BLD-SCNC: 102 MMOL/L (ref 98–111)
CO2: 26 MMOL/L (ref 22–29)
CREAT SERPL-MCNC: 0.6 MG/DL (ref 0.5–0.9)
GFR AFRICAN AMERICAN: >59
GFR NON-AFRICAN AMERICAN: >60
GLUCOSE BLD-MCNC: 181 MG/DL (ref 74–109)
GLUCOSE BLD-MCNC: 220 MG/DL (ref 70–99)
GLUCOSE BLD-MCNC: 227 MG/DL (ref 70–99)
GLUCOSE BLD-MCNC: 260 MG/DL (ref 70–99)
HCT VFR BLD CALC: 32.7 % (ref 37–47)
HEMOGLOBIN: 10.1 G/DL (ref 12–16)
MCH RBC QN AUTO: 28.4 PG (ref 27–31)
MCHC RBC AUTO-ENTMCNC: 30.9 G/DL (ref 33–37)
MCV RBC AUTO: 91.9 FL (ref 81–99)
PDW BLD-RTO: 13.8 % (ref 11.5–14.5)
PERFORMED ON: ABNORMAL
PLATELET # BLD: 161 K/UL (ref 130–400)
PMV BLD AUTO: 11.4 FL (ref 9.4–12.3)
POTASSIUM REFLEX MAGNESIUM: 4.4 MMOL/L (ref 3.5–5)
RBC # BLD: 3.56 M/UL (ref 4.2–5.4)
SARS-COV-2, NAAT: DETECTED
SODIUM BLD-SCNC: 138 MMOL/L (ref 136–145)
WBC # BLD: 8.9 K/UL (ref 4.8–10.8)

## 2022-06-13 PROCEDURE — 85027 COMPLETE CBC AUTOMATED: CPT

## 2022-06-13 PROCEDURE — 36415 COLL VENOUS BLD VENIPUNCTURE: CPT

## 2022-06-13 PROCEDURE — 80048 BASIC METABOLIC PNL TOTAL CA: CPT

## 2022-06-13 PROCEDURE — 6370000000 HC RX 637 (ALT 250 FOR IP): Performed by: STUDENT IN AN ORGANIZED HEALTH CARE EDUCATION/TRAINING PROGRAM

## 2022-06-13 PROCEDURE — 6370000000 HC RX 637 (ALT 250 FOR IP): Performed by: ORTHOPAEDIC SURGERY

## 2022-06-13 PROCEDURE — 6370000000 HC RX 637 (ALT 250 FOR IP): Performed by: HOSPITALIST

## 2022-06-13 PROCEDURE — 87635 SARS-COV-2 COVID-19 AMP PRB: CPT

## 2022-06-13 PROCEDURE — 82947 ASSAY GLUCOSE BLOOD QUANT: CPT

## 2022-06-13 RX ORDER — FOLIC ACID 1 MG/1
1 TABLET ORAL DAILY
Qty: 30 TABLET | Refills: 0 | Status: SHIPPED | OUTPATIENT
Start: 2022-06-14 | End: 2022-07-14

## 2022-06-13 RX ORDER — HYDROCODONE BITARTRATE AND ACETAMINOPHEN 5; 325 MG/1; MG/1
1 TABLET ORAL EVERY 6 HOURS PRN
Qty: 12 TABLET | Refills: 0 | Status: SHIPPED | OUTPATIENT
Start: 2022-06-13 | End: 2022-06-16

## 2022-06-13 RX ORDER — FERROUS SULFATE 325(65) MG
325 TABLET ORAL 2 TIMES DAILY
Qty: 60 TABLET | Refills: 0 | Status: SHIPPED | OUTPATIENT
Start: 2022-06-13 | End: 2022-07-13

## 2022-06-13 RX ORDER — INSULIN GLARGINE 100 [IU]/ML
12 INJECTION, SOLUTION SUBCUTANEOUS 2 TIMES DAILY
Qty: 10 ML | Refills: 0 | Status: SHIPPED | OUTPATIENT
Start: 2022-06-13 | End: 2022-07-13

## 2022-06-13 RX ADMIN — OXYCODONE 10 MG: 5 TABLET ORAL at 05:30

## 2022-06-13 RX ADMIN — INSULIN LISPRO 4 UNITS: 100 INJECTION, SOLUTION INTRAVENOUS; SUBCUTANEOUS at 18:57

## 2022-06-13 RX ADMIN — PAROXETINE HYDROCHLORIDE 20 MG: 20 TABLET, FILM COATED ORAL at 08:25

## 2022-06-13 RX ADMIN — METFORMIN HYDROCHLORIDE 1000 MG: 500 TABLET ORAL at 16:04

## 2022-06-13 RX ADMIN — APIXABAN 2.5 MG: 2.5 TABLET, FILM COATED ORAL at 08:23

## 2022-06-13 RX ADMIN — METFORMIN HYDROCHLORIDE 1000 MG: 500 TABLET ORAL at 08:24

## 2022-06-13 RX ADMIN — LEVOTHYROXINE SODIUM 200 MCG: 100 TABLET ORAL at 05:30

## 2022-06-13 RX ADMIN — OXYCODONE 5 MG: 5 TABLET ORAL at 19:55

## 2022-06-13 RX ADMIN — INSULIN LISPRO 4 UNITS: 100 INJECTION, SOLUTION INTRAVENOUS; SUBCUTANEOUS at 08:29

## 2022-06-13 RX ADMIN — INSULIN GLARGINE 12 UNITS: 100 INJECTION, SOLUTION SUBCUTANEOUS at 08:27

## 2022-06-13 RX ADMIN — ACETAMINOPHEN 650 MG: 325 TABLET ORAL at 05:30

## 2022-06-13 RX ADMIN — FOLIC ACID 1 MG: 1 TABLET ORAL at 08:25

## 2022-06-13 RX ADMIN — INSULIN LISPRO 4 UNITS: 100 INJECTION, SOLUTION INTRAVENOUS; SUBCUTANEOUS at 16:08

## 2022-06-13 RX ADMIN — OXYCODONE 5 MG: 5 TABLET ORAL at 16:06

## 2022-06-13 RX ADMIN — DOCUSATE SODIUM 50 MG AND SENNOSIDES 8.6 MG 1 TABLET: 8.6; 5 TABLET, FILM COATED ORAL at 08:23

## 2022-06-13 RX ADMIN — ATORVASTATIN CALCIUM 40 MG: 40 TABLET, FILM COATED ORAL at 08:23

## 2022-06-13 RX ADMIN — HYDROCHLOROTHIAZIDE 12.5 MG: 12.5 CAPSULE ORAL at 08:24

## 2022-06-13 ASSESSMENT — PAIN DESCRIPTION - PAIN TYPE
TYPE: SURGICAL PAIN
TYPE: SURGICAL PAIN

## 2022-06-13 ASSESSMENT — PAIN DESCRIPTION - DESCRIPTORS
DESCRIPTORS: ACHING;DULL
DESCRIPTORS: THROBBING
DESCRIPTORS: DISCOMFORT

## 2022-06-13 ASSESSMENT — PAIN SCALES - GENERAL
PAINLEVEL_OUTOF10: 5
PAINLEVEL_OUTOF10: 5
PAINLEVEL_OUTOF10: 2
PAINLEVEL_OUTOF10: 6

## 2022-06-13 ASSESSMENT — PAIN DESCRIPTION - ORIENTATION
ORIENTATION: RIGHT

## 2022-06-13 ASSESSMENT — PAIN - FUNCTIONAL ASSESSMENT
PAIN_FUNCTIONAL_ASSESSMENT: PREVENTS OR INTERFERES WITH MANY ACTIVE NOT PASSIVE ACTIVITIES
PAIN_FUNCTIONAL_ASSESSMENT: PREVENTS OR INTERFERES SOME ACTIVE ACTIVITIES AND ADLS
PAIN_FUNCTIONAL_ASSESSMENT: PREVENTS OR INTERFERES SOME ACTIVE ACTIVITIES AND ADLS

## 2022-06-13 ASSESSMENT — PAIN SCALES - WONG BAKER: WONGBAKER_NUMERICALRESPONSE: 6

## 2022-06-13 ASSESSMENT — PAIN DESCRIPTION - FREQUENCY
FREQUENCY: CONTINUOUS
FREQUENCY: INTERMITTENT

## 2022-06-13 ASSESSMENT — PAIN DESCRIPTION - ONSET
ONSET: ON-GOING
ONSET: ON-GOING

## 2022-06-13 ASSESSMENT — PAIN DESCRIPTION - LOCATION
LOCATION: HIP
LOCATION: LEG
LOCATION: HIP

## 2022-06-13 NOTE — DISCHARGE INSTR - DIET
Good nutrition is important when healing from an illness, injury, or surgery. Follow any nutrition recommendations given to you during your hospital stay. If you were given an oral nutrition supplement while in the hospital, continue to take this supplement at home. You can take it with meals, in-between meals, and/or before bedtime. These supplements can be purchased at most local grocery stores, pharmacies, and chain [employer]stores. If you have any questions about your diet or nutrition, call the hospital and ask for the dietitian.     Regular 3 carb choices/meal diet

## 2022-06-13 NOTE — CARE COORDINATION
2nd IMM Letter given to patient. Reviewed, discussed, answered questions, and signed by patient. Signed copy placed in patient's chart.   Electronically signed by Kvng Miranda RN, BSN on 6/13/2022 at 2:43 PM

## 2022-06-13 NOTE — DISCHARGE SUMMARY
[facility] [address] [place] [address]    DEPARTMENT OF HOSPITALIST MEDICINE      DISCHARGE SUMMARY:      PATIENT NAME:  Laura Nunes  :  1940  MRN:  [id number]    Admission Date:   2022  5:03 PM Attending: Jessica Wang MD   Discharge Date:   2022              PCP: BRADEN Cox - NP  Length of Stay: 5 days     Chief Complaint on Admission: No chief complaint on file. Consultants:     IP CONSULT TO ORTHOPEDIC SURGERY  IP CONSULT TO SOCIAL WORK  IP CONSULT TO CASE MANAGEMENT       Discharge Problem List:   Principal Problem:    Open fracture of femur, type I or II, with routine healing  Active Problems:    Other fracture of right femur, initial encounter for closed fracture ([facility]    Vitamin D deficiency    Vitamin B 12 deficiency    Hypothyroidism    Arthritis    Type 2 diabetes mellitus with hyperglycemia, with long-term current use of insulin (HCC)    Hyperlipidemia    Primary osteoarthritis of right hip    Iron deficiency anemia    Type 2 diabetes mellitus with hyperglycemia ([facility]    Essential hypertension    Gastroesophageal reflux disease without esophagitis  Resolved Problems:    Hyperkalemia       Last dated Assessment and Plan . .. 2022:    Mild hyperkalemia  - Hold losartan, dose of Lokelma x1, continue to follow  -- Potassium level is now within normal limits     Iron deficiency  - Patient started on IV Venofer for 3 days  -- Would require oral iron supplementation upon discharge     Hypertension  - Stop losartan due to hyperkalemia, monitor BP  -- Continue HCTZ     DM with uncontrolled hyperglycemia  - Okay to resume Janumet  - POC glucose monitoring   --Patient's blood sugar levels are still 250 and higher  -- Increase basal insulin, add prandial insulin along with corrective SSI  -- Lantus increased to 10 units subcu twice daily  -- Patient need good diabetic complaint to assist in fracture healing     Hypothyroidism  - Continue with [address] COURSE  AND  TREATMENT:    69-year-old female with hypertension, diabetes, presented as transfer from outside facility [facility] ED) due to right femur fracture after she had a fall while twisting her right knee with x-ray showing fracture with mild displacement and some angulation of distal femur. Patient was taken to the OR by orthopedics on 6/9 and underwent ORIF. Did well postoperatively. Did have some mild hyperkalemia so [facility] Iron deficiency noted, given supplementation. Had uncontrolled hyperglycemia so adjusted insulin regimen. Working on SNF placement for rehab which has now been arranged. She is being discharged in stable condition. She is advised to follow-up closely with orthopedics as per their recommendations. Patient was diagnosed with COVID-19 last month, she has finished her quarantine period and is currently asymptomatic. Even though her COVID-19 testing came back positive today, the patient is being safely accepted back to the skilled nursing facility. OBJECTIVE:  BP (!) 114/52   Pulse 73   Temp (!) 95.7 °F (35.4 °C) (Temporal)   Resp 18   Ht 5' 9\" (1.753 m)   Wt 231 lb 6.4 oz (105 kg)   SpO2 92%   BMI 34.17 kg/m²       Heart: RRR   Lungs: Bilateral fair air entry   Abdomen: Soft, non-tender   Extremities: No edema   Neurologic: Alert and oriented   Skin: Warm and dry          Laboratory Data:  Recent Labs     06/11/22  0323 06/12/22  0315 06/13/22  0201   WBC 9.8 9.1 8.9   HGB 10.3* 10.0* 10.1*    173 161     Recent Labs     06/11/22  0323 06/12/22  0315 06/13/22  0201    132* 138   K 4.6 4.4 4.4    99 102   CO2 20* 24 26   BUN 21 17 17   CREATININE 0.8 0.7 0.6   GLUCOSE 244* 238* 181*     No results for input(s): AST, ALT, ALB, BILITOT, ALKPHOS in the last 72 hours. Troponin T: No results for input(s): TROPONINI in the last 72 hours. Pro-BNP: No results for input(s): BNP in the last 72 hours.   INR: No results for input(s): INR in the last 72 hours. UA:No results for input(s): NITRITE, COLORU, PHUR, LABCAST, WBCUA, RBCUA, MUCUS, TRICHOMONAS, YEAST, BACTERIA, CLARITYU, SPECGRAV, LEUKOCYTESUR, UROBILINOGEN, BILIRUBINUR, BLOODU, GLUCOSEU, AMORPHOUS in the last 72 hours. Invalid input(s): Katarzynamayte Gallegos  A1C: No results for input(s): LABA1C in the last 72 hours. ABG:No results for input(s): PHART, TGM3JKQ, PO2ART, KHR9AWE, BEART, HGBAE, O9JEGIPR, CARBOXHGBART in the last 72 hours. Impressions of imaging performed in 48 hours before discharge:    No results found. Medication List      START taking these medications    apixaban 2.5 MG Tabs tablet  Commonly known as: Eliquis  Take 1 tablet by mouth 2 times daily     docusate sodium 100 mg capsule  Commonly known as: Colace  Take 1 capsule by mouth 2 times daily     ferrous sulfate 325 (65 Fe) MG tablet  Commonly known as: IRON 325  Take 1 tablet by mouth in the morning and at bedtime     folic acid 1 MG tablet  Commonly known as: FOLVITE  Take 1 tablet by mouth daily  Start taking on: June 14, 2022     insulin glargine 100 UNIT/ML injection vial  Commonly known as: LANTUS  Inject 12 Units into the skin 2 times daily     ondansetron 4 MG tablet  Commonly known as: Zofran  Take 1 tablet by mouth every 8 hours as needed for Nausea or Vomiting     oxyCODONE-acetaminophen 7.5-325 MG per tablet  Commonly known as: Percocet  Take 1 tablet by mouth every 4-6 hours as needed for Pain for up to 28 days.  Intended supply: 28 days        CONTINUE taking these medications    glipiZIDE 10 MG tablet  Commonly known as: GLUCOTROL     hydroCHLOROthiazide 12.5 MG capsule  Commonly known as: MICROZIDE     levothyroxine 200 MCG tablet  Commonly known as: SYNTHROID     losartan 100 MG tablet  Commonly known as: COZAAR     metFORMIN 1000 MG tablet  Commonly known as: GLUCOPHAGE     PARoxetine 20 MG tablet  Commonly known as: PAXIL     simvastatin 40 MG tablet  Commonly known as: ZOCOR           Where to Get Your Medications      [facility]  [address] [address]    Phone: [phone]   · apixaban 2.5 MG Tabs tablet  · docusate sodium 100 mg capsule  · ferrous sulfate 325 (65 Fe) MG tablet  · folic acid 1 MG tablet  · insulin glargine 100 UNIT/ML injection vial  · ondansetron 4 MG tablet  · oxyCODONE-acetaminophen 7.5-325 MG per tablet           ISSUES TO BE ADDRESSED AT HOSPITAL FOLLOW-UP VISIT:    Advised patient to follow-up closely with PCP upon discharge for management of chronic medical issues  Please see the detailed discharge directions delivered from earlier today! Condition on Discharge: gradually improving  Discharge Disposition: Skilled Facility    Recommended Follow Up:  Arnold Moraes MD  [address] [place] [address]  [phone]    In 2 weeks      Followup Appointments Scheduled at Time of Discharge:  No future appointments. Discharge Instructions:   Please see the discharge paperwork. Patient was seen at bedside today, and the examination shows improvement since yesterday. Detailed discharge directions delivered to the patient by myself and our nursing staff, who verbalizes understanding and is very happy and satisfied with the plan. Patient has been advised to continue all medications as prescribed and advised, and f/u with PCP within 1 week. Patient is stable from medical standpoint to be discharged. Total time spent during patient evaluation and assessment, discussion with the nurse/family, addressing discharge medications/scripts and coordination of care for safe discharge was in excess of 35 minutes.       Signed Electronically:    Nasir Christensen MD  1:33 PM 6/13/2022

## 2022-06-13 NOTE — PROGRESS NOTES
Contacted Tanya with [employer] and requested a non-emergent transport to [facility]

## 2024-06-06 ENCOUNTER — TELEPHONE (OUTPATIENT)
Dept: CARDIOLOGY | Facility: CLINIC | Age: 84
End: 2024-06-06
Payer: MEDICARE

## 2024-06-09 DIAGNOSIS — I25.118 CORONARY ARTERY DISEASE OF NATIVE ARTERY OF NATIVE HEART WITH STABLE ANGINA PECTORIS: Primary | ICD-10-CM

## 2024-06-09 RX ORDER — SODIUM CHLORIDE 0.9 % (FLUSH) 0.9 %
3 SYRINGE (ML) INJECTION EVERY 12 HOURS SCHEDULED
OUTPATIENT
Start: 2024-06-09

## 2024-06-09 RX ORDER — SODIUM CHLORIDE 9 MG/ML
40 INJECTION, SOLUTION INTRAVENOUS AS NEEDED
OUTPATIENT
Start: 2024-06-09

## 2024-06-09 RX ORDER — SODIUM CHLORIDE 0.9 % (FLUSH) 0.9 %
10 SYRINGE (ML) INJECTION AS NEEDED
OUTPATIENT
Start: 2024-06-09

## 2024-06-11 PROBLEM — I25.118 CORONARY ARTERY DISEASE OF NATIVE ARTERY OF NATIVE HEART WITH STABLE ANGINA PECTORIS: Status: ACTIVE | Noted: 2024-06-09

## 2024-07-15 ENCOUNTER — HOSPITAL ENCOUNTER (OUTPATIENT)
Facility: HOSPITAL | Age: 84
Discharge: HOME | End: 2024-07-16
Attending: INTERNAL MEDICINE | Admitting: INTERNAL MEDICINE
Payer: MEDICARE

## 2024-07-15 DIAGNOSIS — I25.118 CORONARY ARTERY DISEASE OF NATIVE ARTERY OF NATIVE HEART WITH STABLE ANGINA PECTORIS: ICD-10-CM

## 2024-07-15 PROBLEM — N19 RENAL FAILURE: Status: ACTIVE | Noted: 2024-07-15

## 2024-07-15 LAB
ALBUMIN SERPL-MCNC: 4.1 G/DL (ref 3.5–5.2)
ALBUMIN/GLOB SERPL: 1.2 G/DL
ALP SERPL-CCNC: 58 U/L (ref 39–117)
ALT SERPL W P-5'-P-CCNC: 12 U/L (ref 1–33)
ANION GAP SERPL CALCULATED.3IONS-SCNC: 10 MMOL/L (ref 5–15)
AST SERPL-CCNC: 13 U/L (ref 1–32)
BASOPHILS # BLD AUTO: 0.05 10*3/MM3 (ref 0–0.2)
BASOPHILS NFR BLD AUTO: 0.7 % (ref 0–1.5)
BILIRUB SERPL-MCNC: 0.3 MG/DL (ref 0–1.2)
BUN SERPL-MCNC: 25 MG/DL (ref 8–23)
BUN/CREAT SERPL: 22.7 (ref 7–25)
CALCIUM SPEC-SCNC: 9.5 MG/DL (ref 8.6–10.5)
CHLORIDE SERPL-SCNC: 104 MMOL/L (ref 98–107)
CO2 SERPL-SCNC: 25 MMOL/L (ref 22–29)
CREAT SERPL-MCNC: 1.1 MG/DL (ref 0.57–1)
DEPRECATED RDW RBC AUTO: 43.2 FL (ref 37–54)
EGFRCR SERPLBLD CKD-EPI 2021: 49.7 ML/MIN/1.73
EOSINOPHIL # BLD AUTO: 0.65 10*3/MM3 (ref 0–0.4)
EOSINOPHIL NFR BLD AUTO: 9.4 % (ref 0.3–6.2)
ERYTHROCYTE [DISTWIDTH] IN BLOOD BY AUTOMATED COUNT: 13.2 % (ref 12.3–15.4)
GLOBULIN UR ELPH-MCNC: 3.4 GM/DL
GLUCOSE BLDC GLUCOMTR-MCNC: 121 MG/DL (ref 70–130)
GLUCOSE SERPL-MCNC: 212 MG/DL (ref 65–99)
HCT VFR BLD AUTO: 41.1 % (ref 34–46.6)
HGB BLD-MCNC: 13.1 G/DL (ref 12–15.9)
IMM GRANULOCYTES # BLD AUTO: 0.02 10*3/MM3 (ref 0–0.05)
IMM GRANULOCYTES NFR BLD AUTO: 0.3 % (ref 0–0.5)
INR PPP: 0.88 (ref 0.91–1.09)
LYMPHOCYTES # BLD AUTO: 2.32 10*3/MM3 (ref 0.7–3.1)
LYMPHOCYTES NFR BLD AUTO: 33.5 % (ref 19.6–45.3)
MCH RBC QN AUTO: 28.4 PG (ref 26.6–33)
MCHC RBC AUTO-ENTMCNC: 31.9 G/DL (ref 31.5–35.7)
MCV RBC AUTO: 89 FL (ref 79–97)
MONOCYTES # BLD AUTO: 0.49 10*3/MM3 (ref 0.1–0.9)
MONOCYTES NFR BLD AUTO: 7.1 % (ref 5–12)
NEUTROPHILS NFR BLD AUTO: 3.4 10*3/MM3 (ref 1.7–7)
NEUTROPHILS NFR BLD AUTO: 49 % (ref 42.7–76)
NRBC BLD AUTO-RTO: 0 /100 WBC (ref 0–0.2)
PLATELET # BLD AUTO: 185 10*3/MM3 (ref 140–450)
PMV BLD AUTO: 10.9 FL (ref 6–12)
POTASSIUM SERPL-SCNC: 4.5 MMOL/L (ref 3.5–5.2)
PROT SERPL-MCNC: 7.5 G/DL (ref 6–8.5)
PROTHROMBIN TIME: 12.3 SECONDS (ref 11.8–14.8)
RBC # BLD AUTO: 4.62 10*6/MM3 (ref 3.77–5.28)
SODIUM SERPL-SCNC: 139 MMOL/L (ref 136–145)
WBC NRBC COR # BLD AUTO: 6.93 10*3/MM3 (ref 3.4–10.8)

## 2024-07-15 PROCEDURE — 63710000001 LOSARTAN 50 MG TABLET: Performed by: INTERNAL MEDICINE

## 2024-07-15 PROCEDURE — 63710000001 HYDROCODONE-ACETAMINOPHEN 5-325 MG TABLET: Performed by: INTERNAL MEDICINE

## 2024-07-15 PROCEDURE — 63710000001 CLOPIDOGREL 75 MG TABLET: Performed by: INTERNAL MEDICINE

## 2024-07-15 PROCEDURE — 85610 PROTHROMBIN TIME: CPT | Performed by: INTERNAL MEDICINE

## 2024-07-15 PROCEDURE — A9270 NON-COVERED ITEM OR SERVICE: HCPCS | Performed by: INTERNAL MEDICINE

## 2024-07-15 PROCEDURE — 63710000001 OXYBUTYNIN 5 MG TABLET: Performed by: INTERNAL MEDICINE

## 2024-07-15 PROCEDURE — 80053 COMPREHEN METABOLIC PANEL: CPT | Performed by: INTERNAL MEDICINE

## 2024-07-15 PROCEDURE — 25510000001 IOPAMIDOL 61 % SOLUTION: Performed by: INTERNAL MEDICINE

## 2024-07-15 PROCEDURE — 93454 CORONARY ARTERY ANGIO S&I: CPT | Performed by: INTERNAL MEDICINE

## 2024-07-15 PROCEDURE — C1769 GUIDE WIRE: HCPCS | Performed by: INTERNAL MEDICINE

## 2024-07-15 PROCEDURE — C9600 PERC DRUG-EL COR STENT SING: HCPCS | Performed by: INTERNAL MEDICINE

## 2024-07-15 PROCEDURE — 63710000001 ROSUVASTATIN 20 MG TABLET: Performed by: INTERNAL MEDICINE

## 2024-07-15 PROCEDURE — C1887 CATHETER, GUIDING: HCPCS | Performed by: INTERNAL MEDICINE

## 2024-07-15 PROCEDURE — 82948 REAGENT STRIP/BLOOD GLUCOSE: CPT

## 2024-07-15 PROCEDURE — 63710000001 METOPROLOL SUCCINATE XL 25 MG TABLET SUSTAINED-RELEASE 24 HOUR: Performed by: INTERNAL MEDICINE

## 2024-07-15 PROCEDURE — 85025 COMPLETE CBC W/AUTO DIFF WBC: CPT | Performed by: INTERNAL MEDICINE

## 2024-07-15 PROCEDURE — 63710000001 MULTIVITAMIN WITH MINERALS TABLET: Performed by: INTERNAL MEDICINE

## 2024-07-15 PROCEDURE — 25010000002 HEPARIN (PORCINE) 1000-0.9 UT/500ML-% SOLUTION: Performed by: INTERNAL MEDICINE

## 2024-07-15 PROCEDURE — 25010000002 MIDAZOLAM HCL (PF) 5 MG/5ML SOLUTION: Performed by: INTERNAL MEDICINE

## 2024-07-15 PROCEDURE — 63710000001 ISOSORBIDE MONONITRATE 30 MG TABLET SUSTAINED-RELEASE 24 HOUR: Performed by: INTERNAL MEDICINE

## 2024-07-15 PROCEDURE — 92928 PRQ TCAT PLMT NTRAC ST 1 LES: CPT | Performed by: INTERNAL MEDICINE

## 2024-07-15 PROCEDURE — 99152 MOD SED SAME PHYS/QHP 5/>YRS: CPT | Performed by: INTERNAL MEDICINE

## 2024-07-15 PROCEDURE — 25810000003 SODIUM CHLORIDE 0.9 % SOLUTION: Performed by: INTERNAL MEDICINE

## 2024-07-15 PROCEDURE — C1894 INTRO/SHEATH, NON-LASER: HCPCS | Performed by: INTERNAL MEDICINE

## 2024-07-15 PROCEDURE — 25010000002 HEPARIN (PORCINE) 2000-0.9 UNIT/L-% SOLUTION: Performed by: INTERNAL MEDICINE

## 2024-07-15 PROCEDURE — 63710000001 SPIRONOLACTONE 25 MG TABLET: Performed by: INTERNAL MEDICINE

## 2024-07-15 PROCEDURE — 63710000001: Performed by: INTERNAL MEDICINE

## 2024-07-15 PROCEDURE — C1874 STENT, COATED/COV W/DEL SYS: HCPCS | Performed by: INTERNAL MEDICINE

## 2024-07-15 PROCEDURE — 25010000002 FENTANYL CITRATE (PF) 50 MCG/ML SOLUTION: Performed by: INTERNAL MEDICINE

## 2024-07-15 PROCEDURE — 63710000001 GLIPIZIDE 10 MG TABLET: Performed by: INTERNAL MEDICINE

## 2024-07-15 PROCEDURE — S0260 H&P FOR SURGERY: HCPCS | Performed by: INTERNAL MEDICINE

## 2024-07-15 PROCEDURE — 63710000001 AMLODIPINE 5 MG TABLET: Performed by: INTERNAL MEDICINE

## 2024-07-15 PROCEDURE — 63710000001 SUCRALFATE 1 G TABLET: Performed by: INTERNAL MEDICINE

## 2024-07-15 DEVICE — XIENCE SKYPOINT™ EVEROLIMUS ELUTING CORONARY STENT SYSTEM 2.50 MM X 18 MM / RAPID-EXCHANGE
Type: IMPLANTABLE DEVICE | Site: CORONARY | Status: FUNCTIONAL
Brand: XIENCE SKYPOINT™

## 2024-07-15 RX ORDER — ACETAMINOPHEN 325 MG/1
650 TABLET ORAL EVERY 4 HOURS PRN
Status: DISCONTINUED | OUTPATIENT
Start: 2024-07-15 | End: 2024-07-16 | Stop reason: HOSPADM

## 2024-07-15 RX ORDER — CLOPIDOGREL BISULFATE 75 MG/1
75 TABLET ORAL DAILY
Status: DISCONTINUED | OUTPATIENT
Start: 2024-07-16 | End: 2024-07-16 | Stop reason: HOSPADM

## 2024-07-15 RX ORDER — DIPHENHYDRAMINE HCL 25 MG
25 CAPSULE ORAL EVERY 6 HOURS PRN
Status: DISCONTINUED | OUTPATIENT
Start: 2024-07-15 | End: 2024-07-16 | Stop reason: HOSPADM

## 2024-07-15 RX ORDER — PANTOPRAZOLE SODIUM 40 MG/1
40 TABLET, DELAYED RELEASE ORAL
Status: DISCONTINUED | OUTPATIENT
Start: 2024-07-16 | End: 2024-07-16 | Stop reason: HOSPADM

## 2024-07-15 RX ORDER — AMLODIPINE BESYLATE 5 MG/1
5 TABLET ORAL DAILY
Status: DISCONTINUED | OUTPATIENT
Start: 2024-07-15 | End: 2024-07-15

## 2024-07-15 RX ORDER — PHENAZOPYRIDINE HYDROCHLORIDE 100 MG/1
100 TABLET, FILM COATED ORAL 3 TIMES DAILY PRN
Status: DISCONTINUED | OUTPATIENT
Start: 2024-07-15 | End: 2024-07-16 | Stop reason: HOSPADM

## 2024-07-15 RX ORDER — GLIPIZIDE 10 MG/1
10 TABLET ORAL
COMMUNITY

## 2024-07-15 RX ORDER — LEVOTHYROXINE SODIUM 175 UG/1
175 TABLET ORAL DAILY
COMMUNITY

## 2024-07-15 RX ORDER — ESCITALOPRAM OXALATE 10 MG/1
20 TABLET ORAL DAILY
Status: DISCONTINUED | OUTPATIENT
Start: 2024-07-15 | End: 2024-07-16 | Stop reason: HOSPADM

## 2024-07-15 RX ORDER — VITAMIN E 268 MG
400 CAPSULE ORAL DAILY
COMMUNITY

## 2024-07-15 RX ORDER — SODIUM CHLORIDE 9 MG/ML
75 INJECTION, SOLUTION INTRAVENOUS CONTINUOUS
Status: DISCONTINUED | OUTPATIENT
Start: 2024-07-15 | End: 2024-07-16 | Stop reason: HOSPADM

## 2024-07-15 RX ORDER — HYDROCHLOROTHIAZIDE 12.5 MG/1
12.5 CAPSULE, GELATIN COATED ORAL DAILY
COMMUNITY

## 2024-07-15 RX ORDER — OXYBUTYNIN CHLORIDE 5 MG/1
5 TABLET ORAL 3 TIMES DAILY
Status: DISCONTINUED | OUTPATIENT
Start: 2024-07-15 | End: 2024-07-16 | Stop reason: HOSPADM

## 2024-07-15 RX ORDER — SODIUM CHLORIDE 0.9 % (FLUSH) 0.9 %
3 SYRINGE (ML) INJECTION EVERY 12 HOURS SCHEDULED
Status: DISCONTINUED | OUTPATIENT
Start: 2024-07-15 | End: 2024-07-15 | Stop reason: HOSPADM

## 2024-07-15 RX ORDER — HEPARIN SODIUM 200 [USP'U]/100ML
INJECTION, SOLUTION INTRAVENOUS
Status: DISCONTINUED | OUTPATIENT
Start: 2024-07-15 | End: 2024-07-15 | Stop reason: HOSPADM

## 2024-07-15 RX ORDER — HYDROCODONE BITARTRATE AND ACETAMINOPHEN 5; 325 MG/1; MG/1
1 TABLET ORAL EVERY 6 HOURS PRN
Status: DISCONTINUED | OUTPATIENT
Start: 2024-07-15 | End: 2024-07-16 | Stop reason: HOSPADM

## 2024-07-15 RX ORDER — HYDROCODONE BITARTRATE AND ACETAMINOPHEN 5; 325 MG/1; MG/1
1 TABLET ORAL EVERY 8 HOURS PRN
Status: ON HOLD | COMMUNITY
End: 2024-07-15

## 2024-07-15 RX ORDER — ASPIRIN 81 MG/1
81 TABLET, CHEWABLE ORAL DAILY
Status: DISCONTINUED | OUTPATIENT
Start: 2024-07-15 | End: 2024-07-15 | Stop reason: SDUPTHER

## 2024-07-15 RX ORDER — LOSARTAN POTASSIUM 50 MG/1
100 TABLET ORAL DAILY
Status: DISCONTINUED | OUTPATIENT
Start: 2024-07-15 | End: 2024-07-16 | Stop reason: HOSPADM

## 2024-07-15 RX ORDER — SPIRONOLACTONE 25 MG/1
25 TABLET ORAL DAILY
COMMUNITY

## 2024-07-15 RX ORDER — ALPRAZOLAM 0.5 MG/1
0.5 TABLET ORAL 3 TIMES DAILY PRN
Status: DISCONTINUED | OUTPATIENT
Start: 2024-07-15 | End: 2024-07-16 | Stop reason: HOSPADM

## 2024-07-15 RX ORDER — LOSARTAN POTASSIUM 100 MG/1
100 TABLET ORAL DAILY
Status: ON HOLD | COMMUNITY
End: 2024-07-15

## 2024-07-15 RX ORDER — CLOPIDOGREL BISULFATE 75 MG/1
TABLET ORAL
Status: DISCONTINUED | OUTPATIENT
Start: 2024-07-15 | End: 2024-07-15 | Stop reason: HOSPADM

## 2024-07-15 RX ORDER — INSULIN GLARGINE 100 [IU]/ML
15 INJECTION, SOLUTION SUBCUTANEOUS DAILY
COMMUNITY

## 2024-07-15 RX ORDER — ISOSORBIDE MONONITRATE 30 MG/1
30 TABLET, EXTENDED RELEASE ORAL DAILY
Status: DISCONTINUED | OUTPATIENT
Start: 2024-07-15 | End: 2024-07-16 | Stop reason: HOSPADM

## 2024-07-15 RX ORDER — SUCRALFATE 1 G/1
1 TABLET ORAL
Status: DISCONTINUED | OUTPATIENT
Start: 2024-07-15 | End: 2024-07-16 | Stop reason: HOSPADM

## 2024-07-15 RX ORDER — AMLODIPINE BESYLATE 5 MG/1
5 TABLET ORAL DAILY
Status: ON HOLD | COMMUNITY
End: 2024-07-15

## 2024-07-15 RX ORDER — INSULIN GLARGINE 100 [IU]/ML
15 INJECTION, SOLUTION SUBCUTANEOUS DAILY
Status: DISCONTINUED | OUTPATIENT
Start: 2024-07-15 | End: 2024-07-16 | Stop reason: HOSPADM

## 2024-07-15 RX ORDER — OXYBUTYNIN CHLORIDE 5 MG/1
5 TABLET ORAL 2 TIMES DAILY
COMMUNITY

## 2024-07-15 RX ORDER — SPIRONOLACTONE 25 MG/1
25 TABLET ORAL DAILY
Status: DISCONTINUED | OUTPATIENT
Start: 2024-07-15 | End: 2024-07-16 | Stop reason: HOSPADM

## 2024-07-15 RX ORDER — SUCRALFATE 1 G/1
1 TABLET ORAL 4 TIMES DAILY
Status: ON HOLD | COMMUNITY
End: 2024-07-15

## 2024-07-15 RX ORDER — TEMAZEPAM 7.5 MG/1
7.5 CAPSULE ORAL NIGHTLY PRN
Status: DISCONTINUED | OUTPATIENT
Start: 2024-07-15 | End: 2024-07-16 | Stop reason: HOSPADM

## 2024-07-15 RX ORDER — MULTIPLE VITAMINS W/ MINERALS TAB 9MG-400MCG
1 TAB ORAL DAILY
Status: DISCONTINUED | OUTPATIENT
Start: 2024-07-15 | End: 2024-07-16 | Stop reason: HOSPADM

## 2024-07-15 RX ORDER — FLUTICASONE PROPIONATE 50 MCG
2 SPRAY, SUSPENSION (ML) NASAL DAILY
COMMUNITY

## 2024-07-15 RX ORDER — ONDANSETRON 2 MG/ML
4 INJECTION INTRAMUSCULAR; INTRAVENOUS EVERY 6 HOURS PRN
Status: DISCONTINUED | OUTPATIENT
Start: 2024-07-15 | End: 2024-07-16 | Stop reason: HOSPADM

## 2024-07-15 RX ORDER — ROSUVASTATIN CALCIUM 20 MG/1
20 TABLET, COATED ORAL NIGHTLY
Status: DISCONTINUED | OUTPATIENT
Start: 2024-07-15 | End: 2024-07-16 | Stop reason: HOSPADM

## 2024-07-15 RX ORDER — ASPIRIN 81 MG/1
TABLET, CHEWABLE ORAL
Status: COMPLETED
Start: 2024-07-15 | End: 2024-07-15

## 2024-07-15 RX ORDER — ASPIRIN 81 MG/1
81 TABLET ORAL DAILY
Status: DISCONTINUED | OUTPATIENT
Start: 2024-07-16 | End: 2024-07-16 | Stop reason: HOSPADM

## 2024-07-15 RX ORDER — SIMVASTATIN 40 MG
40 TABLET ORAL NIGHTLY
Status: ON HOLD | COMMUNITY
End: 2024-07-15

## 2024-07-15 RX ORDER — ESCITALOPRAM OXALATE 20 MG/1
20 TABLET ORAL DAILY
COMMUNITY

## 2024-07-15 RX ORDER — ERGOCALCIFEROL 1.25 MG/1
50000 CAPSULE ORAL
COMMUNITY

## 2024-07-15 RX ORDER — ONDANSETRON 4 MG/1
4 TABLET, ORALLY DISINTEGRATING ORAL EVERY 6 HOURS PRN
Status: DISCONTINUED | OUTPATIENT
Start: 2024-07-15 | End: 2024-07-16 | Stop reason: HOSPADM

## 2024-07-15 RX ORDER — METOPROLOL SUCCINATE 25 MG/1
25 TABLET, EXTENDED RELEASE ORAL DAILY
Status: DISCONTINUED | OUTPATIENT
Start: 2024-07-15 | End: 2024-07-16 | Stop reason: HOSPADM

## 2024-07-15 RX ORDER — FLUTICASONE PROPIONATE 50 MCG
2 SPRAY, SUSPENSION (ML) NASAL DAILY
Status: DISCONTINUED | OUTPATIENT
Start: 2024-07-15 | End: 2024-07-16 | Stop reason: HOSPADM

## 2024-07-15 RX ORDER — ASPIRIN 81 MG/1
324 TABLET, CHEWABLE ORAL DAILY
Status: DISCONTINUED | OUTPATIENT
Start: 2024-07-15 | End: 2024-07-15

## 2024-07-15 RX ORDER — SODIUM CHLORIDE 9 MG/ML
40 INJECTION, SOLUTION INTRAVENOUS AS NEEDED
Status: DISCONTINUED | OUTPATIENT
Start: 2024-07-15 | End: 2024-07-15 | Stop reason: HOSPADM

## 2024-07-15 RX ORDER — ASPIRIN 81 MG/1
81 TABLET, CHEWABLE ORAL DAILY
Status: ON HOLD | COMMUNITY
End: 2024-07-16

## 2024-07-15 RX ORDER — CALCIUM CARBONATE 500 MG/1
2 TABLET, CHEWABLE ORAL 2 TIMES DAILY PRN
Status: DISCONTINUED | OUTPATIENT
Start: 2024-07-15 | End: 2024-07-16 | Stop reason: HOSPADM

## 2024-07-15 RX ORDER — ISOSORBIDE MONONITRATE 30 MG/1
30 TABLET, EXTENDED RELEASE ORAL DAILY
Status: ON HOLD | COMMUNITY
End: 2024-07-15

## 2024-07-15 RX ORDER — LIDOCAINE HYDROCHLORIDE 20 MG/ML
INJECTION, SOLUTION INFILTRATION; PERINEURAL
Status: DISCONTINUED | OUTPATIENT
Start: 2024-07-15 | End: 2024-07-15 | Stop reason: HOSPADM

## 2024-07-15 RX ORDER — PHENAZOPYRIDINE HYDROCHLORIDE 200 MG/1
200 TABLET, FILM COATED ORAL 3 TIMES DAILY PRN
Status: ON HOLD | COMMUNITY
End: 2024-07-15

## 2024-07-15 RX ORDER — METOPROLOL SUCCINATE 25 MG/1
25 TABLET, EXTENDED RELEASE ORAL DAILY
COMMUNITY

## 2024-07-15 RX ORDER — NITROGLYCERIN 0.4 MG/1
0.4 TABLET SUBLINGUAL
Status: DISCONTINUED | OUTPATIENT
Start: 2024-07-15 | End: 2024-07-16 | Stop reason: HOSPADM

## 2024-07-15 RX ORDER — MIDAZOLAM HYDROCHLORIDE 5 MG/5ML
INJECTION, SOLUTION INTRAMUSCULAR; INTRAVENOUS
Status: DISCONTINUED | OUTPATIENT
Start: 2024-07-15 | End: 2024-07-15 | Stop reason: HOSPADM

## 2024-07-15 RX ORDER — AMLODIPINE BESYLATE 5 MG/1
5 TABLET ORAL DAILY
Status: ON HOLD | COMMUNITY
End: 2024-07-15 | Stop reason: SDUPTHER

## 2024-07-15 RX ORDER — AMLODIPINE BESYLATE 5 MG/1
5 TABLET ORAL DAILY
Status: DISCONTINUED | OUTPATIENT
Start: 2024-07-15 | End: 2024-07-16 | Stop reason: HOSPADM

## 2024-07-15 RX ORDER — OMEPRAZOLE 20 MG/1
20 CAPSULE, DELAYED RELEASE ORAL DAILY
COMMUNITY
End: 2024-07-16 | Stop reason: HOSPADM

## 2024-07-15 RX ORDER — GLIPIZIDE 10 MG/1
10 TABLET ORAL
Status: DISCONTINUED | OUTPATIENT
Start: 2024-07-15 | End: 2024-07-16 | Stop reason: HOSPADM

## 2024-07-15 RX ORDER — FENTANYL CITRATE 50 UG/ML
INJECTION, SOLUTION INTRAMUSCULAR; INTRAVENOUS
Status: DISCONTINUED | OUTPATIENT
Start: 2024-07-15 | End: 2024-07-15 | Stop reason: HOSPADM

## 2024-07-15 RX ORDER — SODIUM CHLORIDE 0.9 % (FLUSH) 0.9 %
10 SYRINGE (ML) INJECTION AS NEEDED
Status: DISCONTINUED | OUTPATIENT
Start: 2024-07-15 | End: 2024-07-15 | Stop reason: HOSPADM

## 2024-07-15 RX ORDER — MULTIPLE VITAMINS W/ MINERALS TAB 9MG-400MCG
1 TAB ORAL DAILY
COMMUNITY

## 2024-07-15 RX ADMIN — HYDROCODONE BITARTRATE AND ACETAMINOPHEN 1 TABLET: 5; 325 TABLET ORAL at 20:12

## 2024-07-15 RX ADMIN — METOPROLOL SUCCINATE 25 MG: 25 TABLET, EXTENDED RELEASE ORAL at 16:27

## 2024-07-15 RX ADMIN — SUCRALFATE 1 G: 1 TABLET ORAL at 16:29

## 2024-07-15 RX ADMIN — FLUTICASONE PROPIONATE 2 SPRAY: 50 SPRAY, METERED NASAL at 16:24

## 2024-07-15 RX ADMIN — Medication 1 TABLET: at 16:27

## 2024-07-15 RX ADMIN — SODIUM CHLORIDE 75 ML/HR: 9 INJECTION, SOLUTION INTRAVENOUS at 16:28

## 2024-07-15 RX ADMIN — GLIPIZIDE 10 MG: 10 TABLET ORAL at 16:30

## 2024-07-15 RX ADMIN — OXYBUTYNIN CHLORIDE 5 MG: 5 TABLET ORAL at 16:28

## 2024-07-15 RX ADMIN — ISOSORBIDE MONONITRATE 30 MG: 30 TABLET, EXTENDED RELEASE ORAL at 16:26

## 2024-07-15 RX ADMIN — ROSUVASTATIN CALCIUM 20 MG: 20 TABLET, FILM COATED ORAL at 20:04

## 2024-07-15 RX ADMIN — AMLODIPINE BESYLATE 5 MG: 5 TABLET ORAL at 16:22

## 2024-07-15 RX ADMIN — OXYBUTYNIN CHLORIDE 5 MG: 5 TABLET ORAL at 20:04

## 2024-07-15 RX ADMIN — ASPIRIN 324 MG: 81 TABLET, CHEWABLE ORAL at 11:36

## 2024-07-15 RX ADMIN — ASPIRIN 81 MG CHEWABLE TABLET 324 MG: 81 TABLET CHEWABLE at 11:36

## 2024-07-15 RX ADMIN — SPIRONOLACTONE 25 MG: 25 TABLET ORAL at 16:28

## 2024-07-15 RX ADMIN — SODIUM CHLORIDE 200 ML: 9 INJECTION, SOLUTION INTRAVENOUS at 11:35

## 2024-07-15 RX ADMIN — SUCRALFATE 1 G: 1 TABLET ORAL at 20:04

## 2024-07-15 RX ADMIN — LOSARTAN POTASSIUM 100 MG: 50 TABLET, FILM COATED ORAL at 16:26

## 2024-07-16 VITALS
HEIGHT: 69 IN | WEIGHT: 232 LBS | DIASTOLIC BLOOD PRESSURE: 77 MMHG | HEART RATE: 55 BPM | SYSTOLIC BLOOD PRESSURE: 96 MMHG | RESPIRATION RATE: 16 BRPM | BODY MASS INDEX: 34.36 KG/M2 | TEMPERATURE: 98 F | OXYGEN SATURATION: 94 %

## 2024-07-16 DIAGNOSIS — Z95.5 S/P DRUG ELUTING CORONARY STENT PLACEMENT: Primary | ICD-10-CM

## 2024-07-16 LAB
ANION GAP SERPL CALCULATED.3IONS-SCNC: 10 MMOL/L (ref 5–15)
BUN SERPL-MCNC: 23 MG/DL (ref 8–23)
BUN/CREAT SERPL: 21.9 (ref 7–25)
CALCIUM SPEC-SCNC: 8.5 MG/DL (ref 8.6–10.5)
CHLORIDE SERPL-SCNC: 104 MMOL/L (ref 98–107)
CHOLEST SERPL-MCNC: 243 MG/DL (ref 0–200)
CO2 SERPL-SCNC: 22 MMOL/L (ref 22–29)
CREAT SERPL-MCNC: 1.05 MG/DL (ref 0.57–1)
DEPRECATED RDW RBC AUTO: 44.9 FL (ref 37–54)
EGFRCR SERPLBLD CKD-EPI 2021: 52.5 ML/MIN/1.73
ERYTHROCYTE [DISTWIDTH] IN BLOOD BY AUTOMATED COUNT: 13.6 % (ref 12.3–15.4)
GLUCOSE BLDC GLUCOMTR-MCNC: 164 MG/DL (ref 70–130)
GLUCOSE SERPL-MCNC: 158 MG/DL (ref 65–99)
HBA1C MFR BLD: 7.4 % (ref 4.8–5.6)
HCT VFR BLD AUTO: 37 % (ref 34–46.6)
HDLC SERPL-MCNC: 35 MG/DL (ref 40–60)
HGB BLD-MCNC: 11.9 G/DL (ref 12–15.9)
LDLC SERPL CALC-MCNC: 169 MG/DL (ref 0–100)
LDLC/HDLC SERPL: 4.74 {RATIO}
MCH RBC QN AUTO: 29.1 PG (ref 26.6–33)
MCHC RBC AUTO-ENTMCNC: 32.2 G/DL (ref 31.5–35.7)
MCV RBC AUTO: 90.5 FL (ref 79–97)
PLATELET # BLD AUTO: 141 10*3/MM3 (ref 140–450)
PMV BLD AUTO: 11.5 FL (ref 6–12)
POTASSIUM SERPL-SCNC: 4.8 MMOL/L (ref 3.5–5.2)
RBC # BLD AUTO: 4.09 10*6/MM3 (ref 3.77–5.28)
SODIUM SERPL-SCNC: 136 MMOL/L (ref 136–145)
TRIGL SERPL-MCNC: 210 MG/DL (ref 0–150)
VLDLC SERPL-MCNC: 39 MG/DL (ref 5–40)
WBC NRBC COR # BLD AUTO: 7.54 10*3/MM3 (ref 3.4–10.8)

## 2024-07-16 PROCEDURE — A9270 NON-COVERED ITEM OR SERVICE: HCPCS | Performed by: INTERNAL MEDICINE

## 2024-07-16 PROCEDURE — 63710000001 ASPIRIN 81 MG TABLET DELAYED-RELEASE: Performed by: INTERNAL MEDICINE

## 2024-07-16 PROCEDURE — 82948 REAGENT STRIP/BLOOD GLUCOSE: CPT

## 2024-07-16 PROCEDURE — 63710000001 LEVOTHYROXINE 150 MCG TABLET: Performed by: INTERNAL MEDICINE

## 2024-07-16 PROCEDURE — 63710000001 SUCRALFATE 1 G TABLET: Performed by: INTERNAL MEDICINE

## 2024-07-16 PROCEDURE — 63710000001 OXYBUTYNIN 5 MG TABLET: Performed by: INTERNAL MEDICINE

## 2024-07-16 PROCEDURE — 63710000001 INSULIN GLARGINE PER 5 UNITS: Performed by: INTERNAL MEDICINE

## 2024-07-16 PROCEDURE — 63710000001 AMLODIPINE 5 MG TABLET: Performed by: INTERNAL MEDICINE

## 2024-07-16 PROCEDURE — 25810000003 SODIUM CHLORIDE 0.9 % SOLUTION: Performed by: INTERNAL MEDICINE

## 2024-07-16 PROCEDURE — 63710000001 METFORMIN 500 MG TABLET: Performed by: INTERNAL MEDICINE

## 2024-07-16 PROCEDURE — 63710000001 ISOSORBIDE MONONITRATE 30 MG TABLET SUSTAINED-RELEASE 24 HOUR: Performed by: INTERNAL MEDICINE

## 2024-07-16 PROCEDURE — 80048 BASIC METABOLIC PNL TOTAL CA: CPT | Performed by: INTERNAL MEDICINE

## 2024-07-16 PROCEDURE — 63710000001 CLOPIDOGREL 75 MG TABLET: Performed by: INTERNAL MEDICINE

## 2024-07-16 PROCEDURE — 63710000001 PANTOPRAZOLE 40 MG TABLET DELAYED-RELEASE: Performed by: INTERNAL MEDICINE

## 2024-07-16 PROCEDURE — 63710000001 LEVOTHYROXINE 25 MCG TABLET: Performed by: INTERNAL MEDICINE

## 2024-07-16 PROCEDURE — 99214 OFFICE O/P EST MOD 30 MIN: CPT | Performed by: INTERNAL MEDICINE

## 2024-07-16 PROCEDURE — 63710000001 ESCITALOPRAM 10 MG TABLET: Performed by: INTERNAL MEDICINE

## 2024-07-16 PROCEDURE — 80061 LIPID PANEL: CPT | Performed by: INTERNAL MEDICINE

## 2024-07-16 PROCEDURE — 63710000001 GLIPIZIDE 10 MG TABLET: Performed by: INTERNAL MEDICINE

## 2024-07-16 PROCEDURE — 83036 HEMOGLOBIN GLYCOSYLATED A1C: CPT | Performed by: INTERNAL MEDICINE

## 2024-07-16 PROCEDURE — 63710000001 LOSARTAN 50 MG TABLET: Performed by: INTERNAL MEDICINE

## 2024-07-16 PROCEDURE — 85027 COMPLETE CBC AUTOMATED: CPT | Performed by: INTERNAL MEDICINE

## 2024-07-16 PROCEDURE — 63710000001 MULTIVITAMIN WITH MINERALS TABLET: Performed by: INTERNAL MEDICINE

## 2024-07-16 RX ORDER — ASPIRIN 81 MG/1
81 TABLET, CHEWABLE ORAL DAILY
Qty: 90 TABLET | Refills: 3 | Status: SHIPPED | OUTPATIENT
Start: 2024-07-16

## 2024-07-16 RX ORDER — CLOPIDOGREL BISULFATE 75 MG/1
75 TABLET ORAL DAILY
Qty: 90 TABLET | Refills: 3 | Status: SHIPPED | OUTPATIENT
Start: 2024-07-16

## 2024-07-16 RX ORDER — NITROGLYCERIN 0.4 MG/1
TABLET SUBLINGUAL
Qty: 25 TABLET | Refills: 1 | Status: SHIPPED | OUTPATIENT
Start: 2024-07-16

## 2024-07-16 RX ORDER — ROSUVASTATIN CALCIUM 20 MG/1
20 TABLET, COATED ORAL DAILY
Qty: 90 TABLET | Refills: 3 | Status: SHIPPED | OUTPATIENT
Start: 2024-07-16

## 2024-07-16 RX ADMIN — CLOPIDOGREL BISULFATE 75 MG: 75 TABLET, FILM COATED ORAL at 08:32

## 2024-07-16 RX ADMIN — METFORMIN HCL 1000 MG: 500 TABLET ORAL at 08:30

## 2024-07-16 RX ADMIN — PANTOPRAZOLE SODIUM 40 MG: 40 TABLET, DELAYED RELEASE ORAL at 06:35

## 2024-07-16 RX ADMIN — SUCRALFATE 1 G: 1 TABLET ORAL at 06:33

## 2024-07-16 RX ADMIN — AMLODIPINE BESYLATE 5 MG: 5 TABLET ORAL at 08:32

## 2024-07-16 RX ADMIN — LOSARTAN POTASSIUM 100 MG: 50 TABLET, FILM COATED ORAL at 08:31

## 2024-07-16 RX ADMIN — Medication 1 TABLET: at 08:31

## 2024-07-16 RX ADMIN — OXYBUTYNIN CHLORIDE 5 MG: 5 TABLET ORAL at 08:33

## 2024-07-16 RX ADMIN — INSULIN GLARGINE 15 UNITS: 100 INJECTION, SOLUTION SUBCUTANEOUS at 08:33

## 2024-07-16 RX ADMIN — FLUTICASONE PROPIONATE 2 SPRAY: 50 SPRAY, METERED NASAL at 08:32

## 2024-07-16 RX ADMIN — LEVOTHYROXINE SODIUM 175 MCG: 150 TABLET ORAL at 06:33

## 2024-07-16 RX ADMIN — GLIPIZIDE 10 MG: 10 TABLET ORAL at 06:33

## 2024-07-16 RX ADMIN — ESCITALOPRAM OXALATE 20 MG: 10 TABLET ORAL at 08:32

## 2024-07-16 RX ADMIN — ASPIRIN 81 MG: 81 TABLET, COATED ORAL at 08:31

## 2024-07-16 RX ADMIN — ISOSORBIDE MONONITRATE 30 MG: 30 TABLET, EXTENDED RELEASE ORAL at 08:33

## 2024-07-16 RX ADMIN — SODIUM CHLORIDE 75 ML/HR: 9 INJECTION, SOLUTION INTRAVENOUS at 05:15

## (undated) DEVICE — GLOVE SURG SZ 75 L12IN FNGR THK94MIL TRNSLUC YEL LTX

## (undated) DEVICE — Device

## (undated) DEVICE — CATH F5 INF AL I 100CM: Brand: INFINITI

## (undated) DEVICE — CHLORAPREP 26ML ORANGE

## (undated) DEVICE — COPILOT BLEEDBACK CONTROL VALVE: Brand: COPILOT

## (undated) DEVICE — COVER POS PERINL POST NS 082501

## (undated) DEVICE — DRESSING FOAM SELF ADH 20X10 CM ABSORBENT MEPILEX BORDER

## (undated) DEVICE — PAD, DEFIB, ADULT, RADIOTRANS, PHYSIO: Brand: MEDLINE

## (undated) DEVICE — CATH F5 INF 3DRC 100CM: Brand: INFINITI

## (undated) DEVICE — C-ARMOR C-ARM EQUIPMENT COVERS CLEAR STERILE UNIVERSAL FIT 12 PER CASE: Brand: C-ARMOR

## (undated) DEVICE — RADIFOCUS OPTITORQUE ANGIOGRAPHIC CATHETER: Brand: OPTITORQUE

## (undated) DEVICE — SUTURE VCRL SZ 0 L27IN ABSRB UD L36MM CT-1 1/2 CIR J260H

## (undated) DEVICE — KT NDL GUIDE STRL 18GA

## (undated) DEVICE — PK CATH CARD 30 CA/4

## (undated) DEVICE — GLOVE SURG SZ 8 L12IN FNGR THK13MIL BRN LTX SYN POLYMER W

## (undated) DEVICE — SOL IRR NACL 0.9PCT BT 1000ML

## (undated) DEVICE — WRAP AROUND LENS-STERLIE

## (undated) DEVICE — RUNTHROUGH NS EXTRA FLOPPY PTCA GUIDEWIRE: Brand: RUNTHROUGH

## (undated) DEVICE — GLOVE SURG SZ 75 L12IN FNGR THK79MIL GRN LTX FREE

## (undated) DEVICE — GLOVE SURG SZ 85 L12IN FNGR THK94MIL TRNSLUC YEL LTX

## (undated) DEVICE — BANDAGE COMPR W6INXL10YD ST M E WHITE/BEIGE

## (undated) DEVICE — DRAPE,ANGIO,BRACH,STERILE,38X44: Brand: MEDLINE

## (undated) DEVICE — 6F .070 JL3.5 100CM: Brand: CORDIS

## (undated) DEVICE — SUTURE VCRL SZ 2-0 L36IN ABSRB UD L36MM CT-1 1/2 CIR J945H

## (undated) DEVICE — CATH F5 INF AR I MOD 100CM: Brand: INFINITI

## (undated) DEVICE — PACK ANT HIP CDS

## (undated) DEVICE — GLOVE SURG SZ 85 L12IN FNGR THK79MIL GRN LTX FREE

## (undated) DEVICE — Z INACTIVE USE 2660664 SOLUTION IRRIG 3000ML 0.9% SOD CHL USP UROMATIC PLAS CONT

## (undated) DEVICE — SUP ARMBRD ART/LINE BLU

## (undated) DEVICE — GW STARTER FXD CORE J .035 3X260CM 3MM

## (undated) DEVICE — TR BAND RADIAL ARTERY COMPRESSION DEVICE: Brand: TR BAND

## (undated) DEVICE — PACK,SHOULDER II,SIRUS: Brand: MEDLINE

## (undated) DEVICE — IMPLANTABLE DEVICE
Type: IMPLANTABLE DEVICE | Site: FEMUR | Status: NON-FUNCTIONAL
Removed: 2022-06-09

## (undated) DEVICE — GLIDESHEATH SLENDER STAINLESS STEEL KIT: Brand: GLIDESHEATH SLENDER

## (undated) DEVICE — DEV TORQ GW HOT/PINK

## (undated) DEVICE — GLOVE SURG SZ 7 L12IN FNGR THK79MIL GRN LTX FREE

## (undated) DEVICE — LARYNGOSCOPE BLDE MAC HNDL M SZ 35 ST CURAPLEX CURAVIEW LED

## (undated) DEVICE — LIGHT SUCT UNTETHERED SCINTILLANT

## (undated) DEVICE — BIT DRL L300MM DIA4.3MM QUIK CPL PERC CALIB W/O STP REUSE

## (undated) DEVICE — TOTAL TRAY, 16FR 10ML SIL FOLEY, URN: Brand: MEDLINE

## (undated) DEVICE — SURGICAL PROCEDURE PACK LOWER EXTREMITY LOURDES HOSP

## (undated) DEVICE — TOOL INSRT GW MTL OR PLSTC

## (undated) DEVICE — GLOVE SURG SZ 85 L12IN FNGR THK13MIL BRN LTX SYN POLYMER W

## (undated) DEVICE — SOLIDIFIER LIQUI LOC PLUS 2000CC

## (undated) DEVICE — SOLUTION IV IRRIG POUR BRL 0.9% SODIUM CHL 2F7124

## (undated) DEVICE — GLOVE SURG SZ 9 L12IN FNGR THK13MIL BRN LTX SYN POLYMER W

## (undated) DEVICE — SUTURE VCRL SZ 3-0 L27IN ABSRB UD L19MM PS-2 3/8 CIR PRIM J427H

## (undated) DEVICE — GLOVE SURG SZ 9 L12IN FNGR THK126MIL CRM LTX FREE

## (undated) DEVICE — SYSTEM SKIN CLSR 22CM DERMBND PRINEO

## (undated) DEVICE — CANN NASL ETCO2 LO/FLO A/

## (undated) DEVICE — GLOVE SURG SZ 8 L12IN FNGR THK94MIL TRNSLUC YEL LTX HYDRGEL

## (undated) DEVICE — DUAL CUT SAGITTAL BLADE

## (undated) DEVICE — GUIDEWIRE ORTH DIA2.5MM LOK SMOOTH DRL TIP FLX THRD FOR

## (undated) DEVICE — TUBE ET 7MM NSL ORAL BASIC CUF INTMED MURPHY EYE RADPQ MRK

## (undated) DEVICE — GLOVE SURG SZ 8 L12IN FNGR THK79MIL GRN LTX FREE

## (undated) DEVICE — MODEL BT2000 P/N 700287-012KIT CONTENTS: MANIFOLD WITH SALINE AND CONTRAST PORTS, SALINE TUBING WITH SPIKE AND HAND SYRINGE, TRANSDUCER: Brand: BT2000 AUTOMATED MANIFOLD KIT

## (undated) DEVICE — C-ARM: Brand: UNBRANDED

## (undated) DEVICE — MODEL AT P65, P/N 701554-001KIT CONTENTS: HAND CONTROLLER, 3-WAY HIGH-PRESSURE STOPCOCK WITH ROTATING END AND PREMIUM HIGH-PRESSURE TUBING: Brand: ANGIOTOUCH® KIT

## (undated) DEVICE — PAD,ARMBOARD,CONV,FOAM,2X8X20",12PR/CS: Brand: MEDLINE